# Patient Record
Sex: FEMALE | Race: BLACK OR AFRICAN AMERICAN | Employment: OTHER | URBAN - METROPOLITAN AREA
[De-identification: names, ages, dates, MRNs, and addresses within clinical notes are randomized per-mention and may not be internally consistent; named-entity substitution may affect disease eponyms.]

---

## 2018-09-11 ENCOUNTER — APPOINTMENT (OUTPATIENT)
Dept: CT IMAGING | Age: 83
DRG: 377 | End: 2018-09-11
Attending: EMERGENCY MEDICINE
Payer: MEDICARE

## 2018-09-11 ENCOUNTER — APPOINTMENT (OUTPATIENT)
Dept: GENERAL RADIOLOGY | Age: 83
DRG: 377 | End: 2018-09-11
Attending: EMERGENCY MEDICINE
Payer: MEDICARE

## 2018-09-11 ENCOUNTER — HOSPITAL ENCOUNTER (INPATIENT)
Age: 83
LOS: 6 days | Discharge: HOME | DRG: 377 | End: 2018-09-17
Attending: EMERGENCY MEDICINE | Admitting: HOSPITALIST
Payer: MEDICARE

## 2018-09-11 ENCOUNTER — APPOINTMENT (OUTPATIENT)
Dept: CT IMAGING | Age: 83
DRG: 377 | End: 2018-09-11
Attending: HOSPITALIST
Payer: MEDICARE

## 2018-09-11 DIAGNOSIS — N17.9 AKI (ACUTE KIDNEY INJURY) (HCC): ICD-10-CM

## 2018-09-11 DIAGNOSIS — R41.0 DELIRIUM: Primary | ICD-10-CM

## 2018-09-11 DIAGNOSIS — D64.9 ANEMIA, UNSPECIFIED TYPE: ICD-10-CM

## 2018-09-11 DIAGNOSIS — N30.01 ACUTE CYSTITIS WITH HEMATURIA: ICD-10-CM

## 2018-09-11 PROBLEM — D62 ACUTE BLOOD LOSS ANEMIA: Status: ACTIVE | Noted: 2018-09-11

## 2018-09-11 PROBLEM — K92.2 GI BLEED: Status: ACTIVE | Noted: 2018-09-11

## 2018-09-11 PROBLEM — N39.0 UTI (URINARY TRACT INFECTION): Status: ACTIVE | Noted: 2018-09-11

## 2018-09-11 PROBLEM — E87.0 HYPERNATREMIA: Status: ACTIVE | Noted: 2018-09-11

## 2018-09-11 PROBLEM — F03.90 DEMENTIA (HCC): Status: ACTIVE | Noted: 2018-09-11

## 2018-09-11 LAB
ALBUMIN SERPL-MCNC: 3.1 G/DL (ref 3.5–5)
ALBUMIN/GLOB SERPL: 1 {RATIO} (ref 1.1–2.2)
ALP SERPL-CCNC: 112 U/L (ref 45–117)
ALT SERPL-CCNC: 48 U/L (ref 12–78)
ANION GAP SERPL CALC-SCNC: 7 MMOL/L (ref 5–15)
APPEARANCE UR: ABNORMAL
AST SERPL-CCNC: 47 U/L (ref 15–37)
ATRIAL RATE: 70 BPM
BACTERIA URNS QL MICRO: NEGATIVE /HPF
BASOPHILS # BLD: 0 K/UL (ref 0–0.1)
BASOPHILS NFR BLD: 0 % (ref 0–1)
BILIRUB SERPL-MCNC: 0.4 MG/DL (ref 0.2–1)
BILIRUB UR QL: NEGATIVE
BUN SERPL-MCNC: 64 MG/DL (ref 6–20)
BUN/CREAT SERPL: 21 (ref 12–20)
CALCIUM SERPL-MCNC: 8.5 MG/DL (ref 8.5–10.1)
CALCULATED P AXIS, ECG09: 61 DEGREES
CALCULATED R AXIS, ECG10: 38 DEGREES
CALCULATED T AXIS, ECG11: 83 DEGREES
CHLORIDE SERPL-SCNC: 118 MMOL/L (ref 97–108)
CK MB CFR SERPL CALC: 2.1 % (ref 0–2.5)
CK MB SERPL-MCNC: 26.4 NG/ML (ref 5–25)
CK SERPL-CCNC: 1270 U/L (ref 26–192)
CO2 SERPL-SCNC: 25 MMOL/L (ref 21–32)
COLOR UR: ABNORMAL
CREAT SERPL-MCNC: 3.04 MG/DL (ref 0.55–1.02)
DIAGNOSIS, 93000: NORMAL
DIFFERENTIAL METHOD BLD: ABNORMAL
EOSINOPHIL # BLD: 0.1 K/UL (ref 0–0.4)
EOSINOPHIL NFR BLD: 1 % (ref 0–7)
EPITH CASTS URNS QL MICRO: ABNORMAL /LPF
ERYTHROCYTE [DISTWIDTH] IN BLOOD BY AUTOMATED COUNT: 14 % (ref 11.5–14.5)
EST. AVERAGE GLUCOSE BLD GHB EST-MCNC: ABNORMAL MG/DL
FERRITIN SERPL-MCNC: 22 NG/ML (ref 8–252)
GLOBULIN SER CALC-MCNC: 3.2 G/DL (ref 2–4)
GLUCOSE BLD STRIP.AUTO-MCNC: 124 MG/DL (ref 65–100)
GLUCOSE BLD STRIP.AUTO-MCNC: 130 MG/DL (ref 65–100)
GLUCOSE BLD STRIP.AUTO-MCNC: 80 MG/DL (ref 65–100)
GLUCOSE SERPL-MCNC: 84 MG/DL (ref 65–100)
GLUCOSE UR STRIP.AUTO-MCNC: NEGATIVE MG/DL
HBA1C MFR BLD: <3.5 % (ref 4.2–6.3)
HCT VFR BLD AUTO: 18.6 % (ref 35–47)
HEMOCCULT STL QL: POSITIVE
HGB BLD-MCNC: 5.8 G/DL (ref 11.5–16)
HGB UR QL STRIP: NEGATIVE
IMM GRANULOCYTES # BLD: 0 K/UL (ref 0–0.04)
IMM GRANULOCYTES NFR BLD AUTO: 0 % (ref 0–0.5)
INR PPP: 1 (ref 0.9–1.1)
IRON SATN MFR SERPL: 10 % (ref 20–50)
IRON SERPL-MCNC: 31 UG/DL (ref 35–150)
KETONES UR QL STRIP.AUTO: NEGATIVE MG/DL
LACTATE SERPL-SCNC: 1.2 MMOL/L (ref 0.4–2)
LEUKOCYTE ESTERASE UR QL STRIP.AUTO: ABNORMAL
LYMPHOCYTES # BLD: 0.8 K/UL (ref 0.8–3.5)
LYMPHOCYTES NFR BLD: 11 % (ref 12–49)
MAGNESIUM SERPL-MCNC: 3.2 MG/DL (ref 1.6–2.4)
MCH RBC QN AUTO: 27.9 PG (ref 26–34)
MCHC RBC AUTO-ENTMCNC: 31.2 G/DL (ref 30–36.5)
MCV RBC AUTO: 89.4 FL (ref 80–99)
MONOCYTES # BLD: 0.5 K/UL (ref 0–1)
MONOCYTES NFR BLD: 6 % (ref 5–13)
NEUTS SEG # BLD: 6.2 K/UL (ref 1.8–8)
NEUTS SEG NFR BLD: 82 % (ref 32–75)
NITRITE UR QL STRIP.AUTO: NEGATIVE
NRBC # BLD: 0 K/UL (ref 0–0.01)
NRBC BLD-RTO: 0 PER 100 WBC
P-R INTERVAL, ECG05: 162 MS
PH UR STRIP: 5 [PH] (ref 5–8)
PLATELET # BLD AUTO: 138 K/UL (ref 150–400)
PMV BLD AUTO: 12.5 FL (ref 8.9–12.9)
POTASSIUM SERPL-SCNC: 4.4 MMOL/L (ref 3.5–5.1)
PROT SERPL-MCNC: 6.3 G/DL (ref 6.4–8.2)
PROT UR STRIP-MCNC: NEGATIVE MG/DL
PROTHROMBIN TIME: 10.6 SEC (ref 9–11.1)
Q-T INTERVAL, ECG07: 410 MS
QRS DURATION, ECG06: 84 MS
QTC CALCULATION (BEZET), ECG08: 442 MS
RBC # BLD AUTO: 2.08 M/UL (ref 3.8–5.2)
RBC #/AREA URNS HPF: ABNORMAL /HPF (ref 0–5)
RBC MORPH BLD: ABNORMAL
RBC MORPH BLD: ABNORMAL
SERVICE CMNT-IMP: ABNORMAL
SERVICE CMNT-IMP: ABNORMAL
SERVICE CMNT-IMP: NORMAL
SODIUM SERPL-SCNC: 150 MMOL/L (ref 136–145)
SP GR UR REFRACTOMETRY: 1.01 (ref 1–1.03)
TIBC SERPL-MCNC: 308 UG/DL (ref 250–450)
TROPONIN I SERPL-MCNC: <0.05 NG/ML
TSH SERPL DL<=0.05 MIU/L-ACNC: 2.52 UIU/ML (ref 0.36–3.74)
UROBILINOGEN UR QL STRIP.AUTO: 0.2 EU/DL (ref 0.2–1)
VENTRICULAR RATE, ECG03: 70 BPM
VIT B12 SERPL-MCNC: >2000 PG/ML (ref 193–986)
WBC # BLD AUTO: 7.6 K/UL (ref 3.6–11)
WBC URNS QL MICRO: >100 /HPF (ref 0–4)

## 2018-09-11 PROCEDURE — 71045 X-RAY EXAM CHEST 1 VIEW: CPT

## 2018-09-11 PROCEDURE — 82550 ASSAY OF CK (CPK): CPT | Performed by: HOSPITALIST

## 2018-09-11 PROCEDURE — 80053 COMPREHEN METABOLIC PANEL: CPT | Performed by: EMERGENCY MEDICINE

## 2018-09-11 PROCEDURE — 94761 N-INVAS EAR/PLS OXIMETRY MLT: CPT

## 2018-09-11 PROCEDURE — 83735 ASSAY OF MAGNESIUM: CPT | Performed by: EMERGENCY MEDICINE

## 2018-09-11 PROCEDURE — 85025 COMPLETE CBC W/AUTO DIFF WBC: CPT | Performed by: EMERGENCY MEDICINE

## 2018-09-11 PROCEDURE — 81001 URINALYSIS AUTO W/SCOPE: CPT | Performed by: EMERGENCY MEDICINE

## 2018-09-11 PROCEDURE — 74176 CT ABD & PELVIS W/O CONTRAST: CPT

## 2018-09-11 PROCEDURE — 36415 COLL VENOUS BLD VENIPUNCTURE: CPT | Performed by: EMERGENCY MEDICINE

## 2018-09-11 PROCEDURE — 87040 BLOOD CULTURE FOR BACTERIA: CPT | Performed by: EMERGENCY MEDICINE

## 2018-09-11 PROCEDURE — 82607 VITAMIN B-12: CPT | Performed by: HOSPITALIST

## 2018-09-11 PROCEDURE — 70450 CT HEAD/BRAIN W/O DYE: CPT

## 2018-09-11 PROCEDURE — 82962 GLUCOSE BLOOD TEST: CPT

## 2018-09-11 PROCEDURE — C9113 INJ PANTOPRAZOLE SODIUM, VIA: HCPCS | Performed by: HOSPITALIST

## 2018-09-11 PROCEDURE — 74011000258 HC RX REV CODE- 258: Performed by: HOSPITALIST

## 2018-09-11 PROCEDURE — 84484 ASSAY OF TROPONIN QUANT: CPT | Performed by: HOSPITALIST

## 2018-09-11 PROCEDURE — 82728 ASSAY OF FERRITIN: CPT | Performed by: HOSPITALIST

## 2018-09-11 PROCEDURE — 74011000250 HC RX REV CODE- 250: Performed by: HOSPITALIST

## 2018-09-11 PROCEDURE — 74011250636 HC RX REV CODE- 250/636: Performed by: HOSPITALIST

## 2018-09-11 PROCEDURE — 36430 TRANSFUSION BLD/BLD COMPNT: CPT

## 2018-09-11 PROCEDURE — 83540 ASSAY OF IRON: CPT | Performed by: HOSPITALIST

## 2018-09-11 PROCEDURE — 82272 OCCULT BLD FECES 1-3 TESTS: CPT | Performed by: EMERGENCY MEDICINE

## 2018-09-11 PROCEDURE — 99285 EMERGENCY DEPT VISIT HI MDM: CPT

## 2018-09-11 PROCEDURE — 93005 ELECTROCARDIOGRAM TRACING: CPT

## 2018-09-11 PROCEDURE — 65660000000 HC RM CCU STEPDOWN

## 2018-09-11 PROCEDURE — 85610 PROTHROMBIN TIME: CPT | Performed by: EMERGENCY MEDICINE

## 2018-09-11 PROCEDURE — 71250 CT THORAX DX C-: CPT

## 2018-09-11 PROCEDURE — P9016 RBC LEUKOCYTES REDUCED: HCPCS | Performed by: EMERGENCY MEDICINE

## 2018-09-11 PROCEDURE — 86923 COMPATIBILITY TEST ELECTRIC: CPT | Performed by: EMERGENCY MEDICINE

## 2018-09-11 PROCEDURE — 84443 ASSAY THYROID STIM HORMONE: CPT | Performed by: HOSPITALIST

## 2018-09-11 PROCEDURE — 86900 BLOOD TYPING SEROLOGIC ABO: CPT | Performed by: EMERGENCY MEDICINE

## 2018-09-11 PROCEDURE — 87086 URINE CULTURE/COLONY COUNT: CPT | Performed by: EMERGENCY MEDICINE

## 2018-09-11 PROCEDURE — 83605 ASSAY OF LACTIC ACID: CPT | Performed by: EMERGENCY MEDICINE

## 2018-09-11 PROCEDURE — 83036 HEMOGLOBIN GLYCOSYLATED A1C: CPT | Performed by: HOSPITALIST

## 2018-09-11 PROCEDURE — 96367 TX/PROPH/DG ADDL SEQ IV INF: CPT

## 2018-09-11 PROCEDURE — 96365 THER/PROPH/DIAG IV INF INIT: CPT

## 2018-09-11 PROCEDURE — 74011250636 HC RX REV CODE- 250/636: Performed by: EMERGENCY MEDICINE

## 2018-09-11 PROCEDURE — 74011000258 HC RX REV CODE- 258: Performed by: EMERGENCY MEDICINE

## 2018-09-11 RX ORDER — MELATONIN
1000 DAILY
COMMUNITY

## 2018-09-11 RX ORDER — CETIRIZINE HCL 10 MG
10 TABLET ORAL DAILY
COMMUNITY

## 2018-09-11 RX ORDER — DIPHENHYDRAMINE HCL 25 MG
25 TABLET ORAL
COMMUNITY

## 2018-09-11 RX ORDER — ACETAMINOPHEN 650 MG/1
650 SUPPOSITORY RECTAL
Status: DISCONTINUED | OUTPATIENT
Start: 2018-09-11 | End: 2018-09-17 | Stop reason: HOSPADM

## 2018-09-11 RX ORDER — HALOPERIDOL 5 MG/ML
2 INJECTION INTRAMUSCULAR
Status: DISCONTINUED | OUTPATIENT
Start: 2018-09-11 | End: 2018-09-15

## 2018-09-11 RX ORDER — LOSARTAN POTASSIUM 50 MG/1
50 TABLET ORAL DAILY
COMMUNITY
End: 2018-09-17

## 2018-09-11 RX ORDER — SODIUM CHLORIDE 9 MG/ML
150 INJECTION, SOLUTION INTRAVENOUS ONCE
Status: COMPLETED | OUTPATIENT
Start: 2018-09-11 | End: 2018-09-11

## 2018-09-11 RX ORDER — DEXTROSE 50 % IN WATER (D50W) INTRAVENOUS SYRINGE
12.5-25 AS NEEDED
Status: DISCONTINUED | OUTPATIENT
Start: 2018-09-11 | End: 2018-09-17 | Stop reason: HOSPADM

## 2018-09-11 RX ORDER — ASPIRIN 81 MG/1
81 TABLET ORAL DAILY
COMMUNITY

## 2018-09-11 RX ORDER — SODIUM CHLORIDE 0.9 % (FLUSH) 0.9 %
5-10 SYRINGE (ML) INJECTION AS NEEDED
Status: DISCONTINUED | OUTPATIENT
Start: 2018-09-11 | End: 2018-09-17 | Stop reason: HOSPADM

## 2018-09-11 RX ORDER — INSULIN LISPRO 100 [IU]/ML
INJECTION, SOLUTION INTRAVENOUS; SUBCUTANEOUS EVERY 6 HOURS
Status: DISCONTINUED | OUTPATIENT
Start: 2018-09-11 | End: 2018-09-17 | Stop reason: HOSPADM

## 2018-09-11 RX ORDER — QUETIAPINE FUMARATE 25 MG/1
25 TABLET, FILM COATED ORAL 2 TIMES DAILY
COMMUNITY

## 2018-09-11 RX ORDER — DEXTROSE MONOHYDRATE AND SODIUM CHLORIDE 5; .45 G/100ML; G/100ML
125 INJECTION, SOLUTION INTRAVENOUS CONTINUOUS
Status: DISCONTINUED | OUTPATIENT
Start: 2018-09-11 | End: 2018-09-13

## 2018-09-11 RX ORDER — SODIUM CHLORIDE 0.9 % (FLUSH) 0.9 %
5-10 SYRINGE (ML) INJECTION EVERY 8 HOURS
Status: DISCONTINUED | OUTPATIENT
Start: 2018-09-11 | End: 2018-09-17 | Stop reason: HOSPADM

## 2018-09-11 RX ORDER — ONDANSETRON 2 MG/ML
4 INJECTION INTRAMUSCULAR; INTRAVENOUS
Status: DISCONTINUED | OUTPATIENT
Start: 2018-09-11 | End: 2018-09-17 | Stop reason: HOSPADM

## 2018-09-11 RX ORDER — SODIUM CHLORIDE 9 MG/ML
250 INJECTION, SOLUTION INTRAVENOUS AS NEEDED
Status: DISCONTINUED | OUTPATIENT
Start: 2018-09-11 | End: 2018-09-17 | Stop reason: HOSPADM

## 2018-09-11 RX ORDER — MAGNESIUM SULFATE 100 %
4 CRYSTALS MISCELLANEOUS AS NEEDED
Status: DISCONTINUED | OUTPATIENT
Start: 2018-09-11 | End: 2018-09-17 | Stop reason: HOSPADM

## 2018-09-11 RX ORDER — AMLODIPINE BESYLATE 10 MG/1
10 TABLET ORAL DAILY
COMMUNITY

## 2018-09-11 RX ADMIN — SODIUM CHLORIDE 40 MG: 9 INJECTION, SOLUTION INTRAMUSCULAR; INTRAVENOUS; SUBCUTANEOUS at 14:17

## 2018-09-11 RX ADMIN — SODIUM CHLORIDE 40 MG: 9 INJECTION, SOLUTION INTRAMUSCULAR; INTRAVENOUS; SUBCUTANEOUS at 20:26

## 2018-09-11 RX ADMIN — DEXTROSE MONOHYDRATE AND SODIUM CHLORIDE 125 ML/HR: 5; .45 INJECTION, SOLUTION INTRAVENOUS at 22:15

## 2018-09-11 RX ADMIN — AZITHROMYCIN MONOHYDRATE 500 MG: 500 INJECTION, POWDER, LYOPHILIZED, FOR SOLUTION INTRAVENOUS at 11:51

## 2018-09-11 RX ADMIN — Medication 10 ML: at 14:14

## 2018-09-11 RX ADMIN — CEFTRIAXONE 1 G: 1 INJECTION, POWDER, FOR SOLUTION INTRAMUSCULAR; INTRAVENOUS at 11:10

## 2018-09-11 RX ADMIN — SODIUM CHLORIDE 150 ML/HR: 900 INJECTION, SOLUTION INTRAVENOUS at 10:19

## 2018-09-11 RX ADMIN — DEXTROSE MONOHYDRATE AND SODIUM CHLORIDE 125 ML/HR: 5; .45 INJECTION, SOLUTION INTRAVENOUS at 14:17

## 2018-09-11 RX ADMIN — Medication 10 ML: at 21:37

## 2018-09-11 RX ADMIN — SODIUM CHLORIDE 500 ML: 900 INJECTION, SOLUTION INTRAVENOUS at 10:25

## 2018-09-11 NOTE — ED NOTES
TRANSFER - OUT REPORT: 
 
Verbal report given to Alona Guerrero RN (name) on Dyllan Peoples  being transferred to [address] [place] [address] (unit) for routine progression of care Report consisted of patients Situation, Background, Assessment and  
Recommendations(SBAR). Information from the following report(s) SBAR was reviewed with the receiving nurse. Lines:  
Peripheral IV 09/11/18 Left;Upper Arm (Active) Site Assessment Clean, dry, & intact 9/11/2018  9:49 AM  
Phlebitis Assessment 0 9/11/2018  9:49 AM  
Infiltration Assessment 0 9/11/2018  9:49 AM  
Dressing Status Clean, dry, & intact 9/11/2018  9:49 AM  
   
Peripheral IV 09/11/18 Right Antecubital (Active) Site Assessment Clean, dry, & intact 9/11/2018 12:00 PM  
Phlebitis Assessment 0 9/11/2018 12:00 PM  
Infiltration Assessment 0 9/11/2018 12:00 PM  
Dressing Status Clean, dry, & intact 9/11/2018 12:00 PM  
Dressing Type Tape;Transparent 9/11/2018 12:00 PM  
Hub Color/Line Status Pink;Flushed 9/11/2018 12:00 PM  
  
 
Opportunity for questions and clarification was provided.

## 2018-09-11 NOTE — PROGRESS NOTES
TRANSFER - IN REPORT: 
 
Verbal report received from Rosalva(name) on Boone Lewis  being received from ER(unit) for routine progression of care Report consisted of patients Situation, Background, Assessment and  
Recommendations(SBAR). Information from the following report(s) SBAR, Kardex, Procedure Summary, Intake/Output, MAR and Recent Results was reviewed with the receiving nurse. Opportunity for questions and clarification was provided. Assessment completed upon patients arrival to unit and care assumed.

## 2018-09-11 NOTE — PROGRESS NOTES
Problem: Pressure Injury - Risk of 
Goal: *Prevention of pressure injury Document Rufino Scale and appropriate interventions in the flowsheet. Outcome: Progressing Towards Goal 
Pressure Injury Interventions: 
Sensory Interventions: Assess changes in LOC Moisture Interventions: Apply protective barrier, creams and emollients, Absorbent underpads, Check for incontinence Q2 hours and as needed Activity Interventions: Increase time out of bed, PT/OT evaluation Mobility Interventions: HOB 30 degrees or less Nutrition Interventions: Document food/fluid/supplement intake Friction and Shear Interventions: Apply protective barrier, creams and emollients, HOB 30 degrees or less, Lift sheet

## 2018-09-11 NOTE — IP AVS SNAPSHOT
[address] 
[phone] Patient: Houston Lizarraga MRN: [id number] :[id number] About your hospitalization You were admitted on:  2018 You last received care in the:  [address] You were discharged on:  2018 Why you were hospitalized Your primary diagnosis was:  Acute Blood Loss Anemia Your diagnoses also included:  Delirium, Hypernatremia, Uti (Urinary Tract Infection), Gi Bleed, Dementia, Wood (Acute Kidney Injury) (Formerly [facility]) Follow-up Information Follow up With Details Comments Contact Info Provider Unknown   Patient not available to ask Discharge Orders None A check emma indicates which time of day the medication should be taken. My Medications START taking these medications Instructions Each Dose to Equal  
 Morning Noon Evening Bedtime  
 pantoprazole 40 mg tablet Commonly known as:  PROTONIX Your last dose was: Your next dose is: Take 1 Tab by mouth two (2) times a day. 40 mg CONTINUE taking these medications Instructions Each Dose to Equal  
 Morning Noon Evening Bedtime  
 amLODIPine 10 mg tablet Commonly known as:  Nubia Killian Your last dose was: Your next dose is: Take 10 mg by mouth daily. 10 mg  
    
   
   
   
  
 aspirin delayed-release 81 mg tablet Your last dose was: Your next dose is: Take 81 mg by mouth daily. 81 mg  
    
   
   
   
  
 BENADRYL ALLERGY 25 mg tablet Generic drug:  diphenhydrAMINE Your last dose was: Your next dose is: Take 25 mg by mouth nightly as needed (seasonal allergies). 25 mg  
    
   
   
   
  
 cetirizine 10 mg tablet Commonly known as:  ZYRTEC Your last dose was: Your next dose is: Take 10 mg by mouth daily. 10 mg  
    
   
   
   
  
 cholecalciferol 1,000 unit tablet Commonly known as:  VITAMIN D3 Your last dose was: Your next dose is: Take 1,000 Units by mouth daily. 1000 Units QUEtiapine 25 mg tablet Commonly known as:  SEROquel Your last dose was: Your next dose is: Take 25 mg by mouth two (2) times a day. 25 mg  
    
   
   
   
  
  
STOP taking these medications   
 losartan 50 mg tablet Commonly known as:  COZAAR Where to Get Your Medications Information on where to get these meds will be given to you by the nurse or doctor. ! Ask your nurse or doctor about these medications  
  pantoprazole 40 mg tablet Discharge Instructions HOSPITALIST DISCHARGE INSTRUCTIONS 
 
NAME: Saida Dodson :  1924 MRN:  [id number] Date/Time:  2018 10:11 AM 
 
ADMIT DATE: 2018 DISCHARGE DATE: 2018 Attending Physician: Jerome Krishnan MD 
 
DISCHARGE DIAGNOSIS: 
Anemia Gastritis Medications: Per above medication reconciliation. Pain Management: per above medications Recommended diet: Dysphagia diet Recommended activity: Activity as tolerated Wound care: None Indwelling devices:  None Supplemental Oxygen: None Required Lab work: CBC in 1 week, BMP in 1 week. Glucose management:  None Code status: DNR Outside physician follow up: Follow-up Information Follow up With Details Comments Contact Info Provider Unknown   Patient not available to ask Follow with GI doctor in 2-4 weeks. Will be discharged home under the care of her daughter Information obtained by : 
I understand that if any problems occur once I am at home I am to contact my physician. I understand and acknowledge receipt of the instructions indicated above. Physician's or R.N.'s Signature                                                                  Date/Time Patient or Repres MyChart Announcement We are excited to announce that we are making your provider's discharge notes available to you in [employer] You will see these notes when they are completed and signed by the physician that discharged you from your recent hospital stay. If you have any questions or concerns about any information you see in [employer] please call the Health Information Department where you were seen or reach out to your Primary Care Provider for more information about your plan of care. Introducing [facility]! Chastity Pollard introduces [employer] patient portal. Now you can access parts of your medical record, email your doctor's office, and request medication refills online. 1. In your internet browser, go to [email or web address] 2. Click on the First Time User? Click Here link in the Sign In box. You will see the New Member Sign Up page. 3. Enter your [employer] Access Code exactly as it appears below. You will not need to use this code after youve completed the sign-up process. If you do not sign up before the expiration date, you must request a new code. · [employer] Access Code: [email or web address] Expires: 12/16/2018 10:38 AM 
 
4. Enter the last four digits of your Social Security Number (xxxx) and Date of Birth (mm/dd/yyyy) as indicated and click Submit. You will be taken to the next sign-up page. 5. Create a [employer] ID. This will be your [employer] login ID and cannot be changed, so think of one that is secure and easy to remember. 6. Create a [employer] password. You can change your password at any time. 7. Enter your Password Reset Question and Answer. This can be used at a later time if you forget your password. 8. Enter your e-mail address. You will receive e-mail notification when new information is available in [address] 9. Click Sign Up. You can now view and download portions of your medical record. 10. Click the Download Summary menu link to download a portable copy of your medical information. If you have questions, please visit the Frequently Asked Questions section of the [employer] website. Remember, [employer] is NOT to be used for urgent needs. For medical emergencies, dial 911. Now available from your iPhone and Android! Introducing Deni Liang As a [facility] patient, I wanted to make you aware of our electronic visit tool called Deni Hirocolleennicole. [employer] allows you to connect within minutes with a medical provider 24 hours a day, seven days a week via a mobile device or tablet or logging into a secure website from your computer. You can access Deni Liang from anywhere in the [place]. A virtual visit might be right for you when you have a simple condition and feel like you just dont want to get out of bed, or cant get away from work for an appointment, when your regular [facility] Black[facility] provider is not available (evenings, weekends or holidays), or when youre out of town and need minor care. Electronic visits cost only $49 and if the [employer] provider determines a prescription is needed to treat your condition, one can be electronically transmitted to a nearby pharmacy*. Please take a moment to enroll today if you have not already done so. The enrollment process is free and takes just a few minutes. To enroll, please download the [employer] edgar to your tablet or phone, or visit [email or web address] to enroll on your computer. And, as an [address] [place] [address] patient with a [employer] account, the results of your visits will be scanned into your electronic medical record and your primary care provider will be able to view the scanned results. We urge you to continue to see your regular [facility] provider for your ongoing medical care. And while your primary care provider may not be the one available when you seek a Deni Waldropfin virtual visit, the peace of mind you get from getting a real diagnosis real time can be priceless. For more information on Deni Waldropfin, view our Frequently Asked Questions (FAQs) at [email or web address]. Sincerely, 
 
Delon Thomas MD 
Chief Medical Officer Marco8 Cintia Jessica *:  certain medications cannot be prescribed via Deni Hirocolleenfin Providers Seen During Your Hospitalization Provider Specialty Primary office phone Rusty Alcocer. Lissy Piedra MD Emergency Medicine [phone] Enedina Wolfe MD Internal Medicine [phone] Stephanie Tracy MD Internal Medicine [phone] Warren Staley MD Internal Medicine [phone] Cecil Darnell MD Internal Medicine [phone] Your Primary Care Physician (PCP) Primary Care Physician Office Phone Office Fax UNKNOWN, PROVIDER ** None ** ** None ** You are allergic to the following No active allergies Recent Documentation Weight OB Status Smoking Status 51.2 kg Postmenopausal Never Assessed Emergency Contacts Name Discharge Info Relation Home Work Mobile Christianne Miguel DISCHARGE CAREGIVER [3] Daughter [21] [phone] Patient Belongings The following personal items are in your possession at time of discharge: 
  Dental Appliances: None         Home Medications: None   Jewelry: None  Clothing: Pajamas    Other Valuables: None Please provide this summary of care documentation to your next provider. Signatures-by signing, you are acknowledging that this After Visit Summary has been reviewed with you and you have received a copy. Patient Signature:  ____________________________________________________________ Date:  ____________________________________________________________  
  
Meme Hero Provider Signature:  ____________________________________________________________ Date:  ____________________________________________________________

## 2018-09-11 NOTE — PROGRESS NOTES
PT consult received; noted start time 00:00 9/12 - will f/u tomorrow for evaluation. Soraya Prince, PT, DPT Board-Certified Geriatric Clinical Specialist  
Certified Exercise Expert for Aging Adults

## 2018-09-11 NOTE — PROGRESS NOTES
Pharmacy Clarification of Prior to Admission Medication Regimen The patient was not interviewed regarding clarification of the prior to admission medication regimen. Patient was transferred from [address] [place] [address], to [facility], with a current med list. Pharmacy called [address] [place] [address], [phone], and spoke with Teodoro Pineda, owner, who was able to verify the patient's last administered doses. Information Obtained From: transfer papers Pertinent Pharmacy Findings:  
Updated patients preferred outpatient pharmacy to: [facility] did not update the outpatient pharmacy due to the patient living at a SNF 
 
PTA medication list was corrected to the following:  
 
Prior to Admission Medications Prescriptions Last Dose Informant Patient Reported? Taking? QUEtiapine (SEROQUEL) 25 mg tablet 9/10/2018 at 1700 Transfer Papers Yes Yes Sig: Take 25 mg by mouth two (2) times a day. amLODIPine (NORVASC) 10 mg tablet 9/10/2018 at 0900 Transfer Papers Yes Yes Sig: Take 10 mg by mouth daily. aspirin delayed-release 81 mg tablet 9/10/2018 at 0900 Transfer Papers Yes Yes Sig: Take 81 mg by mouth daily. cetirizine (ZYRTEC) 10 mg tablet 9/10/2018 at 0900 Transfer Papers Yes Yes Sig: Take 10 mg by mouth daily. cholecalciferol (VITAMIN D3) 1,000 unit tablet 9/10/2018 at 0900 Transfer Papers Yes Yes Sig: Take 1,000 Units by mouth daily. diphenhydrAMINE (BENADRYL ALLERGY) 25 mg tablet 9/6/2018 at 2100 Transfer Papers Yes Yes Sig: Take 25 mg by mouth nightly as needed (seasonal allergies). losartan (COZAAR) 50 mg tablet 9/10/2018 at 0900 Transfer Papers Yes Yes Sig: Take 50 mg by mouth daily. Facility-Administered Medications: None Thank you, 
Ana Maria Lewis CPhT Medication History Pharmacy Technician

## 2018-09-11 NOTE — ED PROVIDER NOTES
EMERGENCY DEPARTMENT HISTORY AND PHYSICAL EXAM 
 
 
Date: 9/11/2018 Patient Name: Juan C Alexis History of Presenting Illness Chief Complaint Patient presents with  Agitation EMS reports increased agitation and elevated BP, pt refused to eat and walk since yesterday. FSBS 76. Hx of Dementia, lives at private adult home History Provided By: Patient HPI: Juan C Alexis, 80 y.o. female with PMHx significant for HTN and dementia, presents via EMS transfer from assisted living to the ED with cc of agitation and elevated BP. Pt currently resides in an assisted living home and the staff has sent her for evaluation. They note an increase in agitation and BP along with a refusal to eat or drink since yesterday. Her rectal temp was 94.8F on last check. Her blood sugar was 76. HPI and ROS are limited due to AMS. There are no other complaints, changes, or physical findings at this time. PCP: PROVIDER UNKNOWN 
 
 
 
Past History Past Medical History: No past medical history on file. Past Surgical History: No past surgical history on file. Family History: No family history on file. Social History: 
Social History Substance Use Topics  Smoking status: Not on file  Smokeless tobacco: Not on file  Alcohol use Not on file Allergies: 
No Known Allergies Review of Systems Review of Systems Unable to perform ROS: Mental status change Physical Exam  
Physical Exam  
Constitutional: She is oriented to person, place, and time. She appears well-developed and well-nourished. Elderly female Nonverbal secondary to dementia HENT:  
Head: Normocephalic and atraumatic. Partially edentulous with dry MM Eyes: Conjunctivae and EOM are normal. Right eye exhibits no discharge. Left eye exhibits no discharge. Pupils are pinpoint Neck: Normal range of motion. Neck supple. No JVD present. Cardiovascular: Normal rate, regular rhythm and normal heart sounds. No murmur heard. Pulmonary/Chest: Effort normal and breath sounds normal. No respiratory distress. She has no wheezes. She has no rales. Abdominal: Soft. Bowel sounds are normal. She exhibits no distension. There is no tenderness. Musculoskeletal: Normal range of motion. She exhibits no edema. Neurological: She is alert and oriented to person, place, and time. No cranial nerve deficit. She exhibits normal muscle tone. Skin: Skin is warm and dry. No rash noted. She is not diaphoretic. No evidence of wounds Psychiatric:  
Appears agitated and hallucinatory- grabbing things out of the air Nursing note and vitals reviewed. Diagnostic Study Results Labs - Recent Results (from the past 12 hour(s)) GLUCOSE, POC Collection Time: 09/11/18  9:17 AM  
Result Value Ref Range Glucose (POC) 80 65 - 100 mg/dL Performed by Florencia Blood (PCT) CBC WITH AUTOMATED DIFF Collection Time: 09/11/18  9:34 AM  
Result Value Ref Range WBC 7.6 3.6 - 11.0 K/uL  
 RBC 2.08 (L) 3.80 - 5.20 M/uL HGB 5.8 (LL) 11.5 - 16.0 g/dL HCT 18.6 (L) 35.0 - 47.0 % MCV 89.4 80.0 - 99.0 FL  
 MCH 27.9 26.0 - 34.0 PG  
 MCHC 31.2 30.0 - 36.5 g/dL  
 RDW 14.0 11.5 - 14.5 % PLATELET 875 (L) 717 - 400 K/uL MPV 12.5 8.9 - 12.9 FL  
 NRBC 0.0 0  WBC ABSOLUTE NRBC 0.00 0.00 - 0.01 K/uL NEUTROPHILS PENDING % LYMPHOCYTES PENDING % MONOCYTES PENDING % EOSINOPHILS PENDING % BASOPHILS PENDING % IMMATURE GRANULOCYTES PENDING %  
 ABS. NEUTROPHILS PENDING K/UL  
 ABS. LYMPHOCYTES PENDING K/UL  
 ABS. MONOCYTES PENDING K/UL  
 ABS. EOSINOPHILS PENDING K/UL  
 ABS. BASOPHILS PENDING K/UL  
 ABS. IMM. GRANS. PENDING K/UL  
 DF PENDING   
METABOLIC PANEL, COMPREHENSIVE Collection Time: 09/11/18  9:34 AM  
Result Value Ref Range Sodium 150 (H) 136 - 145 mmol/L  Potassium 4.4 3.5 - 5.1 mmol/L  
 Chloride 118 (H) 97 - 108 mmol/L  
 CO2 25 21 - 32 mmol/L Anion gap 7 5 - 15 mmol/L Glucose 84 65 - 100 mg/dL BUN 64 (H) 6 - 20 MG/DL Creatinine 3.04 (H) 0.55 - 1.02 MG/DL  
 BUN/Creatinine ratio 21 (H) 12 - 20 GFR est AA 17 (L) >60 ml/min/1.73m2 GFR est non-AA 14 (L) >60 ml/min/1.73m2 Calcium 8.5 8.5 - 10.1 MG/DL Bilirubin, total 0.4 0.2 - 1.0 MG/DL  
 ALT (SGPT) 48 12 - 78 U/L  
 AST (SGOT) 47 (H) 15 - 37 U/L Alk. phosphatase 112 45 - 117 U/L Protein, total 6.3 (L) 6.4 - 8.2 g/dL Albumin 3.1 (L) 3.5 - 5.0 g/dL Globulin 3.2 2.0 - 4.0 g/dL A-G Ratio 1.0 (L) 1.1 - 2.2 URINALYSIS W/ RFLX MICROSCOPIC Collection Time: 09/11/18  9:34 AM  
Result Value Ref Range Color YELLOW/STRAW Appearance CLOUDY (A) CLEAR Specific gravity 1.014 1.003 - 1.030    
 pH (UA) 5.0 5.0 - 8.0 Protein NEGATIVE  NEG mg/dL Glucose NEGATIVE  NEG mg/dL Ketone NEGATIVE  NEG mg/dL Bilirubin NEGATIVE  NEG Blood NEGATIVE  NEG Urobilinogen 0.2 0.2 - 1.0 EU/dL Nitrites NEGATIVE  NEG Leukocyte Esterase LARGE (A) NEG MAGNESIUM Collection Time: 09/11/18  9:34 AM  
Result Value Ref Range Magnesium 3.2 (H) 1.6 - 2.4 mg/dL Radiologic Studies -  
CT Results  (Last 48 hours) 09/11/18 0958  CT HEAD WO CONT Final result Impression:  IMPRESSION: No acute intracranial hemorrhage, mass or infarct. Narrative:  INDICATION: Decreased alertness; Dementia, alzheimers possible Exam: Noncontrast CT of the brain is performed with 5 mm collimation. CT dose reduction was achieved with the use of the standardized protocol  
tailored for this examination and automatic exposure control for dose  
modulation. FINDINGS: There is mild, age-appropriate diffuse cortical atrophy with ex vacuo  
dilatation of the ventricular system.  There is no acute intracranial hemorrhage,  
 mass, mass effect or herniation. There is no evidence of acute territorial  
infarct. The gray-white matter differentiation is well-preserved. The mastoid  
air cells are well pneumatized. The visualized paranasal sinuses are normal.  
   
  
  
 
CXR Results  (Last 48 hours) 09/11/18 0945  XR CHEST PORT Final result Impression:  IMPRESSION:  
1. Abnormal opacity in the right suprahilar region extending superiorly could  
reflect pneumonia. Neoplasm is not excluded. Narrative:  EXAM:  XR CHEST PORT INDICATION:  Altered mental status, evaluate for infectious process. COMPARISON: None FINDINGS: A portable AP radiograph of the chest was obtained at 0941 hours. The  
patient is on a cardiac monitor. The heart size is at the upper limits of  
normal.  
   
There is abnormality in the right suprahilar region extending toward the right  
upper lung zone. This may reflect pneumonia this could reflect mass lesion. When  
feasible PA and lateral chest is suggested. CT scan may yield more information. Left lung is clear. Visualized osseous structures are unremarkable. Medical Decision Making I am the first provider for this patient. I reviewed the vital signs, available nursing notes, past medical history, past surgical history, family history and social history. Vital Signs-Reviewed the patient's vital signs. Patient Vitals for the past 12 hrs: 
 Temp Pulse Resp BP SpO2  
09/11/18 0913 94.9 °F (34.9 °C) 69 18 (!) 118/99 100 % Pulse Oximetry Analysis - 100% on RA Cardiac Monitor:  
Rate: 69 bpm 
Rhythm: Normal Sinus Rhythm Records Reviewed: Nursing Notes and Old Medical Records Provider Notes (Medical Decision Making):  
Elderly female with dementia, but normally able to care for her ADLs presenting with acute decompensation. Possible infection, vascular insult, metabolic derangement. ED Course: Initial assessment performed. The patients presenting problems have been discussed, and they are in agreement with the care plan formulated and outlined with them. I have encouraged them to ask questions as they arise throughout their visit. 10:27 AM 
Spoke with family. They are updated regarding the patients current status and recommendations for admission. CONSULT NOTE:  
10:33 AM  
Kristina Wells MD spoke with Dr. Eliu Bell, Specialty: Hospitalist 
Discussed pt's hx, disposition, and available diagnostic and imaging results. Reviewed care plans. Consultant will evaluate pt for admission. Written by Maico Rice, ED Scribe, as dictated by Kristina Wells MD. 
 
Procedure Note - Rectal Exam:  
10:51 AM 
Performed by: Kristina Wells MD 
Chaperoned by: Duncan Palacios, [facility] Tech Rectal exam performed. brown stool was collected. Stool was collected and sent to the lab for Hemoccult testing. Other findings: No gross blood The procedure took 1-15 minutes, and pt tolerated well. Critical Care Time: 
0 Disposition: 
Admit Note: 
10:35 AM 
Pt is being admitted by Dr. Eliu Bell. The results of their tests and reason(s) for their admission have been discussed with pt and/or available family. They convey agreement and understanding for the need to be admitted and for admission diagnosis. PLAN: 
1. Admit to hospitalist 
 
Diagnosis Clinical Impression: 1. Delirium 2. Acute cystitis with hematuria 3. LAWANDA (acute kidney injury) ([facility] [address] 4. Anemia, unspecified type Attestations: This note is prepared by Mamta Dick, acting as Scribe for Kristina Wells MD. Kristina Wells MD: The scribe's documentation has been prepared under my direction and personally reviewed by me in its entirety. I confirm that the note above accurately reflects all work, treatment, procedures, and medical decision making performed by me.

## 2018-09-11 NOTE — IP AVS SNAPSHOT
[address] [facility] [address] 
[phone] Patient: Lucy Lui MRN: [id number] :1924 A check emma indicates which time of day the medication should be taken. My Medications START taking these medications Instructions Each Dose to Equal  
 Morning Noon Evening Bedtime  
 pantoprazole 40 mg tablet Commonly known as:  PROTONIX Your last dose was: Your next dose is: Take 1 Tab by mouth two (2) times a day. 40 mg CONTINUE taking these medications Instructions Each Dose to Equal  
 Morning Noon Evening Bedtime  
 amLODIPine 10 mg tablet Commonly known as:  Rosario Fanti Your last dose was: Your next dose is: Take 10 mg by mouth daily. 10 mg  
    
   
   
   
  
 aspirin delayed-release 81 mg tablet Your last dose was: Your next dose is: Take 81 mg by mouth daily. 81 mg  
    
   
   
   
  
 BENADRYL ALLERGY 25 mg tablet Generic drug:  diphenhydrAMINE Your last dose was: Your next dose is: Take 25 mg by mouth nightly as needed (seasonal allergies). 25 mg  
    
   
   
   
  
 cetirizine 10 mg tablet Commonly known as:  ZYRTEC Your last dose was: Your next dose is: Take 10 mg by mouth daily. 10 mg  
    
   
   
   
  
 cholecalciferol 1,000 unit tablet Commonly known as:  VITAMIN D3 Your last dose was: Your next dose is: Take 1,000 Units by mouth daily. 1000 Units QUEtiapine 25 mg tablet Commonly known as:  SEROquel Your last dose was: Your next dose is: Take 25 mg by mouth two (2) times a day. 25 mg  
    
   
   
   
  
  
STOP taking these medications   
 losartan 50 mg tablet Commonly known as:  COZAAR Where to Get Your Medications Information on where to get these meds will be given to you by the nurse or doctor. ! Ask your nurse or doctor about these medications  
  pantoprazole 40 mg tablet

## 2018-09-11 NOTE — H&P
Hospitalist Admission Note NAME: Adelaide Holloway :  1924 MRN:  [id number] Date/Time:  2018 12:36 PM 
 
Patient PCP: Darling Vasquez in [place] 
______________________________________________________________________ Assessment & Plan: 
Severe anemia hg 5.8 with Heme positive dark brown stool, POA concerning for GI bleed, ?upper since BUN elevated 64. 
--transfuse 2 units prbc. Goal hgb >7. 
--per daughter no hx anemia requiring transfusion, no hx PUD. Has had colonoscopy done, possibly at [facility]. 
--hold aspirin 
--empiric IV PPI 
--CT abdomen. GI consult if CT negative. --check iron profile, retic, B12, ferritin. Hypernatremia Na 150 LAWANDA Cr 3.04, possibly due to dehydration along with ARB use 
--IVF with 1/2NS 
--CT abdomen to eval for hydronephrosis, stone 
--no hx renal dz per daughter Possible uti with hypothermia, pyuria 
--no bacteria on urine. Continue rocephin. Check urine culture Acute delirium with agitation in setting of dementia,  
--per daughter Ashutosh Corbett and caregiver Shira Palomo, baseline conversant, playful but strong minded, ambulate independently, require assist with dress/bath, can feed self. --currently agitated with medical intervention or examination -- using arms to push examiner away, trying to bite examiner's arms, nonverbal. 
--CT head without acute process. --Suspect metabolic encephalopathy. --npo until more alert, cooperative. IVF, haldol prn 
--if mental status not improving with correction of Na and hydration, consider MRI brain and neurology consult. Scarring in right suprahilar on CTA chest.  Abnormal CXR with right suprahilar airspace disease 
--given rocephin and azithromycin in ER for possible PNA. However, CTA chest without lung mass or airspace disease. Abnormality on CXR due to atelectasis vs. Scar tissue. Not hypoxic.  
 
HTN 
 --reportedly /146 at assisted living. Has not taken meds yesterday or today. --BP normal here. Hold losartan due to master 
--hold amlodipine due to npo. 
--IV labetalol prn 
 
DM type 2 
--low dose SSI. Not on meds at home. Check A1c prior to transfusion There is no height or weight on file to calculate BMI. Code: d/w with mPOA, DNR/DNI 
DVT prophylaxis: SCD Surrogate decision maker:  POA daughter Juan Francisco Bran lives in [place] [phone].  [facility] is not POA Subjective: CHIEF COMPLAINT:   Agitation, elevated BP HISTORY OF PRESENT ILLNESS:    
Dong Bower is a 80 y.o.  female with dementia, HTN, DM, anxiety who is sent from private assisted living for agitation and elevated BP, possible stroke. Patient usually lives at home in [place] with  and has caregiver part time. Because daughter and caregiver going on vacation, she was put into private assisted living facility home with her  for 3 months during summer, with plan to return home later this month. At baseline, awake and conversant appropriately but does not remember; usually playful, stubborn, feed self, ambulate independently. Daughter visited over weekend and noted patient seemed somewhat lethargic and cold. Has not been eating much for several days because dentures were misplaced. Dentures found yesterday. + weight loss this year. Had complained of abdominal pain last week. Facility sent patient because she was agitated and /146 today. Has not eat or walk since yesterday We were asked to admit for work up and evaluation of the above problems. Past Medical History:  
Diagnosis Date  Dementia  Diabetes ([facility] [address]  Hypertension  Psychiatric disorder Anxiety History reviewed. No pertinent surgical history. Social History Substance Use Topics  Smoking status: Not on file  Smokeless tobacco: Not on file  Alcohol use Not on file History reviewed. No pertinent family history. Unkown No Known Allergies Prior to Admission medications Medication Sig Start Date End Date Taking? Authorizing Provider  
losartan (COZAAR) 50 mg tablet Take 50 mg by mouth daily. Yes Sam Flores MD  
amLODIPine (NORVASC) 10 mg tablet Take 10 mg by mouth daily. Yes Sam Flores MD  
cholecalciferol (VITAMIN D3) 1,000 unit tablet Take 1,000 Units by mouth daily. Yes Sam Flores MD  
cetirizine (ZYRTEC) 10 mg tablet Take 10 mg by mouth daily. Yes Sam Flores MD  
aspirin delayed-release 81 mg tablet Take 81 mg by mouth daily. Yes Sam Flores MD  
QUEtiapine (SEROQUEL) 25 mg tablet Take 25 mg by mouth two (2) times a day. Yes Sam Flores MD  
diphenhydrAMINE (BENADRYL ALLERGY) 25 mg tablet Take 25 mg by mouth nightly as needed (seasonal allergies). Yes Sam Flores MD  
 
REVIEW OF SYSTEMS:  POSITIVE= Bold. Negative = normal text Unable to obtain due to mental status Objective: VITALS:   
Visit Vitals  BP (!) 111/39 (BP 1 Location: Right arm, BP Patient Position: At rest)  Pulse 69  Temp 97.2 °F (36.2 °C)  Resp 18  Wt 51.2 kg (112 lb 14 oz)  SpO2 100% Temp (24hrs), Av.4 °F (35.8 °C), Min:94.9 °F (34.9 °C), Max:97.2 °F (36.2 °C) There is no height or weight on file to calculate BMI. PHYSICAL EXAM: 
 
General:    Thin, chronically ill appearing female, nonverbal.  Become restless and try to bite when tried to examined patient. No distress, appears stated age. HEENT: Atraumatic, anicteric sclerae, unable to check conjunctiva or pupil size as patient not cooperative. Missing teeth causing facial asymmetry. Not opening mouth. Neck:  Supple, symmetrical,  thyroid: non tender Lungs:   Poor inspiratory effort. Clear to auscultation bilaterally. No Wheezing or Rhonchi. No rales. Chest wall:  No tenderness  No Accessory muscle use. Heart:   Regular  rhythm,  No  murmur   No gallop. No edema. Abdomen:   Pushing examiner's hands away. Seems to grimace with palpation, not distended, dull to percussion. Bowel sounds normal. No masses Extremities: No cyanosis. No clubbing Skin:     Not pale Not Jaundiced  No rashes Psych:  Mild agitated when examining patient. Ethelene Gauss Neurologic: Not making eye contact Left ptosis, +facial asymmetry but may be due to lack of dentures. Aphasic. Moving arms and pushing away examiner with arms. Withdraw legs to touch. oriented X 0.. IMAGING RESULTS: 
 []       I have personally reviewed the actual   []     CXR  []     CT scan CXR: 
CT : 
EKG: 
 ________________________________________________________________________ Care Plan discussed with: 
  Comments Patient Family  y Daughter Winf[place] bedside RN Care Manager Consultant:     
________________________________________________________________________ Prophylaxis: 
GI PPI  
DVT SCD  
________________________________________________________________________ Recommended Disposition: TBD 
________________________________________________________________________ Code Status: 
Full Code DNR/DNI y  
________________________________________________________________________ TOTAL TIME:  70 minutes 
 
______________________________________________________________________ Karen Brink MD 
 
 
Procedures: see electronic medical records for all procedures/Xrays and details which were not copied into this note but were reviewed prior to creation of Plan. LAB DATA REVIEWED:   
Recent Results (from the past 24 hour(s)) GLUCOSE, POC Collection Time: 09/11/18  9:17 AM  
Result Value Ref Range Glucose (POC) 80 65 - 100 mg/dL Performed by Blease Homans (PCT) CBC WITH AUTOMATED DIFF Collection Time: 09/11/18  9:34 AM  
Result Value Ref Range WBC 7.6 3.6 - 11.0 K/uL  
 RBC 2.08 (L) 3.80 - 5.20 M/uL HGB 5.8 (LL) 11.5 - 16.0 g/dL HCT 18.6 (L) 35.0 - 47.0 % MCV 89.4 80.0 - 99.0 FL  
 MCH 27.9 26.0 - 34.0 PG  
 MCHC 31.2 30.0 - 36.5 g/dL  
 RDW 14.0 11.5 - 14.5 % PLATELET 049 (L) 616 - 400 K/uL MPV 12.5 8.9 - 12.9 FL  
 NRBC 0.0 0  WBC ABSOLUTE NRBC 0.00 0.00 - 0.01 K/uL NEUTROPHILS 82 (H) 32 - 75 % LYMPHOCYTES 11 (L) 12 - 49 % MONOCYTES 6 5 - 13 % EOSINOPHILS 1 0 - 7 % BASOPHILS 0 0 - 1 % IMMATURE GRANULOCYTES 0 0.0 - 0.5 % ABS. NEUTROPHILS 6.2 1.8 - 8.0 K/UL  
 ABS. LYMPHOCYTES 0.8 0.8 - 3.5 K/UL  
 ABS. MONOCYTES 0.5 0.0 - 1.0 K/UL  
 ABS. EOSINOPHILS 0.1 0.0 - 0.4 K/UL  
 ABS. BASOPHILS 0.0 0.0 - 0.1 K/UL  
 ABS. IMM. GRANS. 0.0 0.00 - 0.04 K/UL  
 DF AUTOMATED    
 RBC COMMENTS TARGET CELLS 1+ RBC COMMENTS HYPOCHROMIA 1+ METABOLIC PANEL, COMPREHENSIVE Collection Time: 09/11/18  9:34 AM  
Result Value Ref Range Sodium 150 (H) 136 - 145 mmol/L Potassium 4.4 3.5 - 5.1 mmol/L Chloride 118 (H) 97 - 108 mmol/L  
 CO2 25 21 - 32 mmol/L Anion gap 7 5 - 15 mmol/L Glucose 84 65 - 100 mg/dL BUN 64 (H) 6 - 20 MG/DL Creatinine 3.04 (H) 0.55 - 1.02 MG/DL  
 BUN/Creatinine ratio 21 (H) 12 - 20 GFR est AA 17 (L) >60 ml/min/1.73m2 GFR est non-AA 14 (L) >60 ml/min/1.73m2 Calcium 8.5 8.5 - 10.1 MG/DL Bilirubin, total 0.4 0.2 - 1.0 MG/DL  
 ALT (SGPT) 48 12 - 78 U/L  
 AST (SGOT) 47 (H) 15 - 37 U/L Alk. phosphatase 112 45 - 117 U/L Protein, total 6.3 (L) 6.4 - 8.2 g/dL Albumin 3.1 (L) 3.5 - 5.0 g/dL Globulin 3.2 2.0 - 4.0 g/dL A-G Ratio 1.0 (L) 1.1 - 2.2 URINALYSIS W/ RFLX MICROSCOPIC Collection Time: 09/11/18  9:34 AM  
Result Value Ref Range Color YELLOW/STRAW Appearance CLOUDY (A) CLEAR Specific gravity 1.014 1.003 - 1.030    
 pH (UA) 5.0 5.0 - 8.0 Protein NEGATIVE  NEG mg/dL Glucose NEGATIVE  NEG mg/dL Ketone NEGATIVE  NEG mg/dL Bilirubin NEGATIVE  NEG  Blood NEGATIVE  NEG    
 Urobilinogen 0.2 0.2 - 1.0 EU/dL Nitrites NEGATIVE  NEG Leukocyte Esterase LARGE (A) NEG    
 WBC >100 (H) 0 - 4 /hpf  
 RBC 0-5 0 - 5 /hpf Epithelial cells FEW FEW /lpf Bacteria NEGATIVE  NEG /hpf MAGNESIUM Collection Time: 09/11/18  9:34 AM  
Result Value Ref Range Magnesium 3.2 (H) 1.6 - 2.4 mg/dL TYPE + CROSSMATCH Collection Time: 09/11/18 10:28 AM  
Result Value Ref Range Crossmatch Expiration 09/14/2018 ABO/Rh(D) B POSITIVE Antibody screen NEG Unit number [id number] Blood component type [facility] Unit division 00 Status of unit ALLOCATED Crossmatch result Compatible Unit number [id number] Blood component type [facility] Unit division 00 Status of unit ALLOCATED Crossmatch result Compatible LACTIC ACID Collection Time: 09/11/18 10:28 AM  
Result Value Ref Range Lactic acid 1.2 0.4 - 2.0 MMOL/L  
PROTHROMBIN TIME + INR Collection Time: 09/11/18 11:04 AM  
Result Value Ref Range INR 1.0 0.9 - 1.1 Prothrombin time 10.6 9.0 - 11.1 sec OCCULT BLOOD, STOOL Collection Time: 09/11/18 11:04 AM  
Result Value Ref Range  Occult blood, stool POSITIVE (A) NEG

## 2018-09-11 NOTE — IP AVS SNAPSHOT
Summary of Care Report The Summary of Care report has been created to help improve care coordination. Users with access to [employer] or [address] (Web-based application) may access additional patient information including the Discharge Summary. If you are not currently a [address] user and need more information, please call the number listed below in the [place] [address] section and ask to be connected with Medical Records. Facility Information Name Address Phone [address] [address] [phone] Patient Information Patient Name Sex  Jyoti Jose ([id number]) Female 1924 Discharge Information Admitting Provider Service Area Unit  
 Evelia Becerra MD / [phone] [address] [facility] [address]  / [phone] Discharge Provider Discharge Date/Time Discharge Disposition Destination (none) 2018 Afternoon (Pending) SNF (none) Patient Language Language ENGLISH [13] Hospital Problems as of 2018  Reviewed: 2018 10:07 AM by Nicole Lakhani MD  
  
  
  
 Class Noted - Resolved Last Modified POA Active Problems Dementia  2018 - Present 2018 by Nicole Lakhani MD Yes Entered by Evelia Becerra MD  
  
Non-Hospital Problems as of 2018  Reviewed: 2018 10:07 AM by Nicole Lakhani MD  
 None You are allergic to the following No active allergies Current Discharge Medication List  
  
START taking these medications Dose & Instructions Dispensing Information Comments  
 pantoprazole 40 mg tablet Commonly known as:  PROTONIX Dose:  40 mg Take 1 Tab by mouth two (2) times a day. Quantity:  60 Tab Refills:  0 CONTINUE these medications which have NOT CHANGED Dose & Instructions Dispensing Information Comments amLODIPine 10 mg tablet Commonly known as:  Love Breach Dose:  10 mg Take 10 mg by mouth daily. Refills:  0  
   
 aspirin delayed-release 81 mg tablet Dose:  81 mg Take 81 mg by mouth daily. Refills:  0  
   
 BENADRYL ALLERGY 25 mg tablet Generic drug:  diphenhydrAMINE Dose:  25 mg Take 25 mg by mouth nightly as needed (seasonal allergies). Refills:  0  
   
 cetirizine 10 mg tablet Commonly known as:  ZYRTEC Dose:  10 mg Take 10 mg by mouth daily. Refills:  0  
   
 cholecalciferol 1,000 unit tablet Commonly known as:  VITAMIN D3 Dose:  1000 Units Take 1,000 Units by mouth daily. Refills:  0 QUEtiapine 25 mg tablet Commonly known as:  SEROquel Dose:  25 mg Take 25 mg by mouth two (2) times a day. Refills:  0 STOP taking these medications Comments  
 losartan 50 mg tablet Commonly known as:  COZAAR Surgery Information ID Date/Time Status Primary Surgeon All Procedures Location [id number] 2018 Debbie Staley MD ESOPHAGOGASTRODUODENOSCOPY (EGD) [facility]    
 ESOPHAGOGASTRODUODENOSCOPY (EGD):  Consent from poa; ?1030   
 [id number] 2018 0830 Jaquelin Moody MD ESOPHAGOGASTRODUODENOSCOPY (EGD) ESOPHAGOGASTRODUODENAL (EGD) BIOPSY [facility] Follow-up Information Follow up With Details Comments Contact Info Provider Unknown   Patient not available to ask Discharge Instructions HOSPITALIST DISCHARGE INSTRUCTIONS 
 
NAME: Anuj Leon :  1924 MRN:  [id number] Date/Time:  2018 10:11 AM 
 
ADMIT DATE: 2018 DISCHARGE DATE: 2018 Attending Physician: Kristie Kim MD 
 
DISCHARGE DIAGNOSIS: 
Anemia Gastritis Medications: Per above medication reconciliation. Pain Management: per above medications Recommended diet: Dysphagia diet Recommended activity: Activity as tolerated Wound care: None Indwelling devices:  None Supplemental Oxygen: None Required Lab work: CBC in 1 week, BMP in 1 week. Glucose management:  None Code status: DNR Outside physician follow up: Follow-up Information Follow up With Details Comments Contact Info Provider Unknown   Patient not available to ask Follow with GI doctor in 2-4 weeks. Will be discharged home under the care of her daughter Information obtained by : 
I understand that if any problems occur once I am at home I am to contact my physician. I understand and acknowledge receipt of the instructions indicated above. Physician's or R.N.'s Signature                                                                  Date/Time Patient or Repres Chart Review Routing History Recipient Method Report Sent By Malu Gonzalez Provider Unknown, MD  
Patient not available to ask [address] IP Auto Routed Notes Janes Mullen MD [[id number]] 9/12/2018 12:51 AM 09/12/2018 Provider Unknown, MD  
Patient not available to ask [address] IP Auto Routed Notes Holly Hoyt MD [[id number]] 9/17/2018 10:19 AM 09/17/2018

## 2018-09-12 LAB
ABO + RH BLD: NORMAL
ALBUMIN SERPL-MCNC: 2.9 G/DL (ref 3.5–5)
ALBUMIN/GLOB SERPL: 1 {RATIO} (ref 1.1–2.2)
ALP SERPL-CCNC: 105 U/L (ref 45–117)
ALT SERPL-CCNC: 45 U/L (ref 12–78)
ANION GAP SERPL CALC-SCNC: 8 MMOL/L (ref 5–15)
AST SERPL-CCNC: 57 U/L (ref 15–37)
BACTERIA SPEC CULT: NORMAL
BASOPHILS # BLD: 0 K/UL (ref 0–0.1)
BASOPHILS NFR BLD: 0 % (ref 0–1)
BILIRUB SERPL-MCNC: 1.5 MG/DL (ref 0.2–1)
BLD PROD TYP BPU: NORMAL
BLD PROD TYP BPU: NORMAL
BLOOD GROUP ANTIBODIES SERPL: NORMAL
BPU ID: NORMAL
BPU ID: NORMAL
BUN SERPL-MCNC: 48 MG/DL (ref 6–20)
BUN/CREAT SERPL: 19 (ref 12–20)
CALCIUM SERPL-MCNC: 7.8 MG/DL (ref 8.5–10.1)
CC UR VC: NORMAL
CHLORIDE SERPL-SCNC: 122 MMOL/L (ref 97–108)
CO2 SERPL-SCNC: 21 MMOL/L (ref 21–32)
CREAT SERPL-MCNC: 2.51 MG/DL (ref 0.55–1.02)
CROSSMATCH RESULT,%XM: NORMAL
CROSSMATCH RESULT,%XM: NORMAL
DIFFERENTIAL METHOD BLD: ABNORMAL
EOSINOPHIL # BLD: 0.1 K/UL (ref 0–0.4)
EOSINOPHIL NFR BLD: 1 % (ref 0–7)
ERYTHROCYTE [DISTWIDTH] IN BLOOD BY AUTOMATED COUNT: 14.1 % (ref 11.5–14.5)
GLOBULIN SER CALC-MCNC: 2.9 G/DL (ref 2–4)
GLUCOSE BLD STRIP.AUTO-MCNC: 107 MG/DL (ref 65–100)
GLUCOSE BLD STRIP.AUTO-MCNC: 111 MG/DL (ref 65–100)
GLUCOSE BLD STRIP.AUTO-MCNC: 114 MG/DL (ref 65–100)
GLUCOSE BLD STRIP.AUTO-MCNC: 115 MG/DL (ref 65–100)
GLUCOSE BLD STRIP.AUTO-MCNC: 126 MG/DL (ref 65–100)
GLUCOSE SERPL-MCNC: 112 MG/DL (ref 65–100)
HCT VFR BLD AUTO: 28.1 % (ref 35–47)
HGB BLD-MCNC: 9.2 G/DL (ref 11.5–16)
IMM GRANULOCYTES # BLD: 0.1 K/UL (ref 0–0.04)
IMM GRANULOCYTES NFR BLD AUTO: 1 % (ref 0–0.5)
LYMPHOCYTES # BLD: 1 K/UL (ref 0.8–3.5)
LYMPHOCYTES NFR BLD: 16 % (ref 12–49)
MAGNESIUM SERPL-MCNC: 2.8 MG/DL (ref 1.6–2.4)
MCH RBC QN AUTO: 28.9 PG (ref 26–34)
MCHC RBC AUTO-ENTMCNC: 32.7 G/DL (ref 30–36.5)
MCV RBC AUTO: 88.4 FL (ref 80–99)
MONOCYTES # BLD: 0.6 K/UL (ref 0–1)
MONOCYTES NFR BLD: 10 % (ref 5–13)
NEUTS SEG # BLD: 4.5 K/UL (ref 1.8–8)
NEUTS SEG NFR BLD: 72 % (ref 32–75)
NRBC # BLD: 0.02 K/UL (ref 0–0.01)
NRBC BLD-RTO: 0.3 PER 100 WBC
PHOSPHATE SERPL-MCNC: 3.8 MG/DL (ref 2.6–4.7)
PLATELET # BLD AUTO: 120 K/UL (ref 150–400)
PMV BLD AUTO: 12.1 FL (ref 8.9–12.9)
POTASSIUM SERPL-SCNC: 4.3 MMOL/L (ref 3.5–5.1)
PROT SERPL-MCNC: 5.8 G/DL (ref 6.4–8.2)
RBC # BLD AUTO: 3.18 M/UL (ref 3.8–5.2)
RBC MORPH BLD: ABNORMAL
SERVICE CMNT-IMP: ABNORMAL
SERVICE CMNT-IMP: NORMAL
SODIUM SERPL-SCNC: 151 MMOL/L (ref 136–145)
SPECIMEN EXP DATE BLD: NORMAL
STATUS OF UNIT,%ST: NORMAL
STATUS OF UNIT,%ST: NORMAL
UNIT DIVISION, %UDIV: 0
UNIT DIVISION, %UDIV: 0
WBC # BLD AUTO: 6.3 K/UL (ref 3.6–11)

## 2018-09-12 PROCEDURE — 82962 GLUCOSE BLOOD TEST: CPT

## 2018-09-12 PROCEDURE — 97530 THERAPEUTIC ACTIVITIES: CPT

## 2018-09-12 PROCEDURE — 94760 N-INVAS EAR/PLS OXIMETRY 1: CPT

## 2018-09-12 PROCEDURE — 97161 PT EVAL LOW COMPLEX 20 MIN: CPT

## 2018-09-12 PROCEDURE — 74011250636 HC RX REV CODE- 250/636: Performed by: HOSPITALIST

## 2018-09-12 PROCEDURE — 80053 COMPREHEN METABOLIC PANEL: CPT | Performed by: HOSPITALIST

## 2018-09-12 PROCEDURE — 74011000250 HC RX REV CODE- 250: Performed by: HOSPITALIST

## 2018-09-12 PROCEDURE — 74011000258 HC RX REV CODE- 258: Performed by: HOSPITALIST

## 2018-09-12 PROCEDURE — C9113 INJ PANTOPRAZOLE SODIUM, VIA: HCPCS | Performed by: HOSPITALIST

## 2018-09-12 PROCEDURE — 65660000000 HC RM CCU STEPDOWN

## 2018-09-12 PROCEDURE — 36415 COLL VENOUS BLD VENIPUNCTURE: CPT | Performed by: HOSPITALIST

## 2018-09-12 PROCEDURE — 83735 ASSAY OF MAGNESIUM: CPT | Performed by: HOSPITALIST

## 2018-09-12 PROCEDURE — 84100 ASSAY OF PHOSPHORUS: CPT | Performed by: HOSPITALIST

## 2018-09-12 PROCEDURE — 85025 COMPLETE CBC W/AUTO DIFF WBC: CPT | Performed by: HOSPITALIST

## 2018-09-12 RX ADMIN — DEXTROSE MONOHYDRATE AND SODIUM CHLORIDE 125 ML/HR: 5; .45 INJECTION, SOLUTION INTRAVENOUS at 14:55

## 2018-09-12 RX ADMIN — Medication 10 ML: at 11:20

## 2018-09-12 RX ADMIN — Medication 10 ML: at 05:47

## 2018-09-12 RX ADMIN — SODIUM CHLORIDE 40 MG: 9 INJECTION, SOLUTION INTRAMUSCULAR; INTRAVENOUS; SUBCUTANEOUS at 21:14

## 2018-09-12 RX ADMIN — CEFTRIAXONE 1 G: 1 INJECTION, POWDER, FOR SOLUTION INTRAMUSCULAR; INTRAVENOUS at 08:35

## 2018-09-12 RX ADMIN — Medication 10 ML: at 14:59

## 2018-09-12 RX ADMIN — SODIUM CHLORIDE 40 MG: 9 INJECTION, SOLUTION INTRAMUSCULAR; INTRAVENOUS; SUBCUTANEOUS at 08:36

## 2018-09-12 RX ADMIN — Medication 10 ML: at 08:37

## 2018-09-12 RX ADMIN — DEXTROSE MONOHYDRATE AND SODIUM CHLORIDE 125 ML/HR: 5; .45 INJECTION, SOLUTION INTRAVENOUS at 05:47

## 2018-09-12 RX ADMIN — DEXTROSE MONOHYDRATE AND SODIUM CHLORIDE 125 ML/HR: 5; .45 INJECTION, SOLUTION INTRAVENOUS at 22:15

## 2018-09-12 RX ADMIN — Medication 10 ML: at 21:14

## 2018-09-12 NOTE — PROGRESS NOTES
Problem: Mobility Impaired (Adult and Pediatric) Goal: *Acute Goals and Plan of Care (Insert Text) Physical Therapy Goals Initiated 9/12/2018 1. Patient will move from supine to sit and sit to supine  in bed with minimal assistance/contact guard assist within 7 day(s). 2.  Patient will transfer from bed to chair and chair to bed with moderate assistance  using the least restrictive device within 7 day(s). 3.  Patient will perform sit to stand with minimal assistance/contact guard assist within 7 day(s). 4.  Patient will ambulate with moderate assistance  for 50 feet with the least restrictive device within 7 day(s). 5.  Patient will follow >60% 1 step functional commands within 7 days. physical Therapy EVALUATION Patient: Aminta Herrmann (89 y.o. female) Date: 9/12/2018 Primary Diagnosis: Delirium UTI (urinary tract infection) LAWANDA (acute kidney injury) ([facility] [address] Hypernatremia Acute blood loss anemia GI bleed Dementia Precautions: Hx agitation and biting ASSESSMENT : 
Based on the objective data described below, the patient presents with baseline dementia, impaired command following, impaired balance, strength, and poor functional independence following admission for UTI with increased confusion. Hx was limited during eval d/t patient confusion and unable to provide any meaningful hx and little prior hx in the chart. The chart indicates that she lives in a private assisted living and indicates no prior DME. Facilitated transfer to EOB with maximum assistance and additional time with simple descriptions planned actions (to limit agitation). Noted poor management of oral secretions and ? Intention tremors when asked to  the RW. Sit to stand with moderate assist x2 with RW use on left and hand hold assist on right d/t severe tremors. Patient unable to maintain stance or side step to return to bed and required maximum assist x2 for sit to supine d/t poor initiation of task herself. The chart indicates that this patient was ambulatory 2 days ago and appears below her baseline. Will follow to further assess and progress with cognitive improvement. Recommend discharge to SNF vs baseline LTC. Patient will benefit from skilled intervention to address the above impairments. Patients rehabilitation potential is considered to be Fair Factors which may influence rehabilitation potential include:  
[]         None noted 
[x]         Mental ability/status []         Medical condition 
[]         Home/family situation and support systems 
[]         Safety awareness 
[]         Pain tolerance/management 
[]         Other: PLAN : 
Recommendations and Planned Interventions: 
[x]           Bed Mobility Training             [x]    Neuromuscular Re-Education 
[x]           Transfer Training                   []    Orthotic/Prosthetic Training 
[x]           Gait Training                         []    Modalities [x]           Therapeutic Exercises           []    Edema Management/Control 
[x]           Therapeutic Activities            []    Patient and Family Training/Education 
[]           Other (comment): Frequency/Duration: Patient will be followed by physical therapy  3 times a week to address goals. Discharge Recommendations: Konstantin Pope vs prior level of care Further Equipment Recommendations for Discharge: None SUBJECTIVE:  
Patient stated noting contributory OBJECTIVE DATA SUMMARY:  
HISTORY:   
Past Medical History:  
Diagnosis Date  Dementia  Diabetes ([facility] [address]  Hypertension  Psychiatric disorder Anxiety History reviewed. No pertinent surgical history. Prior Level of Function/Home Situation: unknown Personal factors and/or comorbidities impacting plan of care: dementia Home Situation Home Environment: Assisted living One/Two Story Residence: One story Living Alone: No 
Support Systems: Child(luis fernando), Assisted living Patient Expects to be Discharged to[de-identified] Assisted living Current DME Used/Available at Home: None EXAMINATION/PRESENTATION/DECISION MAKING:  
Critical Behavior: 
Neurologic State: Confused Orientation Level: Disoriented to place, Disoriented to situation, Disoriented to time Cognition: Impaired decision making, Decreased command following, Impulsive Hearing: Auditory Auditory Impairment: Hard of hearing, right side Skin:   
Edema:  
Range Of Motion: 
AROM: Grossly decreased, non-functional 
  
  
  
  
  
  
  
Strength:   
Strength: Generally decreased, functional 
  
  
  
  
  
  
Tone & Sensation:  
Tone: Abnormal (muscular tremors, greatest with RUE attempting to trasnfer) Coordination: 
  
 
Functional Mobility: 
Bed Mobility: 
  
Supine to Sit: Maximum assistance Sit to Supine: Maximum assistance Transfers: 
Sit to Stand: Moderate assistance;Assist x2 Balance:  
Sitting: Impaired Sitting - Static: Fair (occasional) Sitting - Dynamic: Fair (occasional) Standing: Impaired Standing - Static: Poor Standing - Dynamic : Poor Functional Measure: 
Tinetti test: 
 
Sitting Balance: 1 Arises: 0 Attempts to Rise: 0 Immediate Standing Balance: 0 Standing Balance: 0 Nudged: 0 Eyes Closed: 0 Turn 360 Degrees - Continuous/Discontinuous: 0 Turn 360 Degrees - Steady/Unsteady: 0 Sitting Down: 0 Balance Score: 1 Indication of Gait: 0 
R Step Length/Height: 0 
L Step Length/Height: 0 
R Foot Clearance: 0 
L Foot Clearance: 0 Step Symmetry: 0 Step Continuity: 0 Path: 0 Trunk: 0 Walking Time: 0 Gait Score: 0 Total Score: 1 Tinetti Test and G-code impairment scale: 
Percentage of Impairment CH 
 
0% 
 CI 
 
1-19% CJ 
 
20-39% CK 
 
40-59% CL 
 
60-79% CM 
 
80-99% CN  
 
100% Tinetti Score 0-28 28 23-27 17-22 12-16 6-11 1-5 0 Tinetti Tool Score Risk of Falls 
<19 = High Fall Risk 19-24 = Moderate Fall Risk 25-28 = Low Fall Risk Tinetti ME. Performance-Oriented Assessment of Mobility Problems in Elderly Patients. [facility]; [id number]. (Scoring Description: PT Bulletin Feb. 10, 1993) Older adults: Abram Hunt et al, 2009; n = [address] [facility] elderly evaluated with ABC, EMRE, ADL, and IADL) · Mean EMRE score for males aged 69-68 years = 26.21(3.40) · Mean EMRE score for females age 69-68 years = 25.16(4.30) · Mean EMRE score for males over 80 years = 23.29(6.02) · Mean EMRE score for females over 80 years = 17.20(8.32) G codes: In compliance with CMSs Claims Based Outcome Reporting, the following G-code set was chosen for this patient based on their primary functional limitation being treated: The outcome measure chosen to determine the severity of the functional limitation was the Tinetti with a score of 1/28 which was correlated with the impairment scale. ? Mobility - Walking and Moving Around:  
  - CURRENT STATUS: CM - 80%-99% impaired, limited or restricted  - GOAL STATUS: CL - 60%-79% impaired, limited or restricted  - D/C STATUS:  ---------------To be determined--------------- Physical Therapy Evaluation Charge Determination History Examination Presentation Decision-Making MEDIUM  Complexity : 1-2 comorbidities / personal factors will impact the outcome/ POC  MEDIUM Complexity : 3 Standardized tests and measures addressing body structure, function, activity limitation and / or participation in recreation  LOW Complexity : Stable, uncomplicated  LOW Complexity : FOTO score of  Based on the above components, the patient evaluation is determined to be of the following complexity level: LOW Pain: 
Pain Scale 1: Numeric (0 - 10) Pain Intensity 1: 0 Activity Tolerance:  
 
Please refer to the flowsheet for vital signs taken during this treatment. After treatment:  
[]         Patient left in no apparent distress sitting up in chair [x]         Patient left in no apparent distress in bed 
[x]         Call bell left within reach [x]         Nursing notified 
[]         Caregiver present [x]         Bed alarm activated COMMUNICATION/EDUCATION:  
The patients plan of care was discussed with: Registered Nurse. [x]         Fall prevention education was provided  
[x]         Patient/family have participated as able in goal setting and plan of care. []         Patient/family agree to work toward stated goals and plan of care. []         Patient understands intent and goals of therapy, but is neutral about his/her participation. [x]         Patient is unable to participate in goal setting and plan of care. Thank you for this referral. 
Tyshawn Castanon, PT, DPT Time Calculation: 22 mins

## 2018-09-12 NOTE — PROGRESS NOTES
OT referral received, chart reviewed and patient screened for OT needs by speaking with Ivette Soto at [facility] where patient resides. Spoke with multiple family members who stated they were unable to provide prior level of function information. Phone number to [facility] provided by family. Per Ivette Soto the patient was total A for all ADLs, with the exception of being able to feed herself with supervision/setup, was total A for bed mobility in the morning due to being resistant, and was able to ambulate short distances with hand hold assist. The patient lives on the second floor and uses a stair lift up and down the steps. At this time the patient is not appropriate for OT services due to her overall total A baseline for ADLs. Unable to assess self feeding due to patient being NPO and Ivette Soto reporting that the patient will only feed herself if hungary and does not really follow commands. Will complete OT orders.

## 2018-09-12 NOTE — PROGRESS NOTES
Reason for Admission:   UTI RRAT Score:     21 Do you (patient/family) have any concerns for transition/discharge? no 
           
Plan for utilizing home health: May benefit Likelihood of readmission?   moderate Transition of Care Plan:      Pt admitted with UTI. I called and spoke with the caregiver, Ms Stevie Quan at [facility] on [address] [place] [address], [id number], cell [phone]//fax [phone] who states the pt had lived in the [facility], the dtr had her placed with [facility] more than 3 months ago, she is a DNR, the dtr [address] lives in [place]. [facility], the pt is \"estranged\" from her , she would like us to fax the pertinent chart info to her at d/c, and PCP should now be Dr Brian Tapia. I'll fax her the H and P for now. She will fax me the AD, DDNR, and insurance card info. Care Management Interventions Transition of Care Consult (CM Consult): Discharge Planning MyChart Signup: No 
Discharge Durable Medical Equipment: Yes Physical Therapy Consult: Yes Occupational Therapy Consult: Yes Speech Therapy Consult: No 
Current Support Network: Adult Group Home Confirm Follow Up Transport: Family Plan discussed with Pt/Family/Caregiver: Yes Freedom of Choice Offered: Yes Discharge Location Discharge Placement: Group home

## 2018-09-12 NOTE — PROGRESS NOTES
Bedside shift change report given to Freddie Briseno RN (oncoming nurse) by Brandy Ac RN (offgoing nurse). Report included the following information SBAR, Kardex, Intake/Output and Recent Results.

## 2018-09-12 NOTE — PROGRESS NOTES
Hospitalist Progress Note Hillary Orta MD. Cell: [phone] NAME:  Yeimy Vu :  1924 MRN:  [id number] Date of Service:  2018 Summary: 80 y.o. female who presented on 2018 with agitation and elevated BP. Assessment/Plan: 
Severe anemia hg 5.8 with Heme positive dark brown stool, POA concerning for GI bleed, ?upper since BUN elevated 64. 
- s/p 2 units of prbc transfusion. Post transfusion cbc 9.2 
- continue to monitor H/H 
- for EGD later today or tomorrow. Spoke to GI and [address] [place] [address] states she does not want colonoscopy if it comes to that. 
  
Hypernatremia Na 150 LAWANDA Cr 3.04, possibly due to dehydration along with ARB use 
--IVF with 1/2NS 
--No stone or hydronephrosis on CT abdomen 
- Continue IVF, repeat BMP in AM 
  
Possible uti with hypothermia, pyuria 
--no bacteria on urine.   
- No growth on urine culture. 
  
Acute metabolic encephalopathy in the setting of dementia Head CT scan unremarkable. Continue supportive care 
  
Scarring in right suprahilar on CTA chest.  Abnormal CXR with right suprahilar airspace disease 
--given rocephin and azithromycin in ER for possible PNA. However, CTA chest without lung mass or airspace disease. Abnormality on CXR due to atelectasis vs. Scar tissue. Not hypoxi or toxic looking. Antibiotics discontinued. 
  
HTN 
--reportedly /146 at assisted living. Has not taken meds yesterday or today. --BP normal here. Hold losartan due to lawanda 
--hold amlodipine due to npo. 
--IV labetalol prn 
  
DM type 2 
--low dose SSI. Not on meds at home. hb1c < 3.5 
- continue accu checks 
   
There is no height or weight on file to calculate BMI. 
  
Code: d/w with mPODUKE, DNR/DNI 
DVT prophylaxis: SCD Surrogate decision maker:  CORETTA العراقي lives in [place] [phone].  Marcos Senia [facility] paezfather) is not POA Code status: DNR 
DVT prophylaxsis: SCD Dispo: to be detremined Interval History/Subjective: 
F/u for GI bleed No acute overnight event. Patient is a poor historian Review of Systems: 
Review of systems not obtained due to patient factors. Objective: VITALS:  
Last 24hrs VS reviewed since prior progress note. Most recent are: 
Visit Vitals  /85  Pulse 70  Temp 96.4 °F (35.8 °C)  Resp 12  Wt 51.2 kg (112 lb 14 oz)  SpO2 97% Intake/Output Summary (Last 24 hours) at 09/12/18 1354 Last data filed at 09/12/18 0317 Gross per 24 hour Intake           1470.8 ml Output                0 ml Net           1470.8 ml PHYSICAL EXAM: 
General: No acute distress, cooperative, EENT: EOMI. Anicteric sclerae. Oral mucous moist, oropharynx benign Resp: CTA bilaterally. No wheezing/rhonchi/rales. No accessory muscle use CV: Regular rhythm, normal rate, no murmurs, gallops, rubs GI: Soft, non distended, non tender. normoactive bowel sounds, no hepatosplenomegaly Extremities: No edema, warm, 2+ pulses throughout Neurologic: Moves all extremities. dementia Psych: Good insight. Not anxious nor agitated. Skin: Good Turgor, no rashes or ulcers Lab Data Personally Reviewed: (see below) Medications list Personally Reviewed:  x YES  NO  
 
_______________________________________________________________________ Care Plan discussed with:  Patient/Family and Nurse Total NON critical care TIME:  30 minutes Evalina Castleman, MD  
 
Procedures: see electronic medical records for all procedures/Xrays and details which were not copied into this note but were reviewed prior to creation of Plan. LABS: 
Recent Labs  
   09/12/18 
 0008  09/11/18 
 1699 WBC  6.3  7.6 HGB  9.2*  5.8* HCT  28.1*  18.6*  
PLT  120*  138* Recent Labs  
   09/12/18 
 0008  09/11/18 
 5971 NA  151*  150* K  4.3  4.4  
CL  122*  118* CO2  21  25 BUN  48*  64* CREA  2.51*  3.04* GLU  112*  84  
CA  7.8*  8.5 MG  2.8*  3.2*  
PHOS  3.8   --   
 
Recent Labs  
   09/12/18 
 0008  09/11/18 
 0934 SGOT  57*  47* ALT  45  48 AP  105  112 TBILI  1.5*  0.4 TP  5.8*  6.3* ALB  2.9*  3.1*  
GLOB  2.9  3.2 Recent Labs  
   09/11/18 
 1104 INR  1.0 PTP  10.6 Recent Labs  
   09/11/18 
 1534 TIBC  308 PSAT  10* FERR  22 No results found for: FOL, RBCF No results for input(s): PH, PCO2, PO2 in the last 72 hours. Recent Labs  
   09/11/18 
 1534  09/11/18 
 1245 CPK   --   1270* CKNDX   --   2.1 TROIQ  <0.05   -- No results found for: CHOL, CHOLX, CHLST, CHOLV, HDL, LDL, LDLC, DLDLP, TGLX, TRIGL, TRIGP, CHHD, CHHDX Lab Results Component Value Date/Time Glucose (POC) 107 (H) 09/12/2018 11:27 AM  
 Glucose (POC) 126 (H) 09/12/2018 08:26 AM  
 Glucose (POC) 111 (H) 09/12/2018 05:46 AM  
 Glucose (POC) 130 (H) 09/11/2018 11:54 PM  
 Glucose (POC) 124 (H) 09/11/2018 06:17 PM  
 
Lab Results Component Value Date/Time  Color YELLOW/STRAW 09/11/2018 09:34 AM  
 Appearance CLOUDY (A) 09/11/2018 09:34 AM  
 Specific gravity 1.014 09/11/2018 09:34 AM  
 pH (UA) 5.0 09/11/2018 09:34 AM  
 Protein NEGATIVE  09/11/2018 09:34 AM  
 Glucose NEGATIVE  09/11/2018 09:34 AM  
 Ketone NEGATIVE  09/11/2018 09:34 AM  
 Bilirubin NEGATIVE  09/11/2018 09:34 AM  
 Urobilinogen 0.2 09/11/2018 09:34 AM  
 Nitrites NEGATIVE  09/11/2018 09:34 AM  
 Leukocyte Esterase LARGE (A) 09/11/2018 09:34 AM  
 Epithelial cells FEW 09/11/2018 09:34 AM  
 Bacteria NEGATIVE  09/11/2018 09:34 AM  
 WBC >100 (H) 09/11/2018 09:34 AM  
 RBC 0-5 09/11/2018 09:34 AM

## 2018-09-12 NOTE — PROGRESS NOTES
Bedside shift change report given to Nadege (oncoming nurse) by Vin Campbell (offgoing nurse). Report included the following information SBAR, Kardex, Intake/Output, MAR and Recent Results. Zone Phone for oncoming shift:   [phone] Shift Summary: Pt rested through night. No issues with pain. Pt very agitated when bothered. LDAs Peripheral IV 09/11/18 Left;Upper Arm (Active) Site Assessment Clean, dry, & intact 9/12/2018  3:22 AM  
Phlebitis Assessment 0 9/12/2018  3:22 AM  
Infiltration Assessment 0 9/12/2018  3:22 AM  
Dressing Status Clean, dry, & intact 9/12/2018  3:22 AM  
Dressing Type Transparent;Tape 9/12/2018  3:22 AM  
Hub Color/Line Status Pink;Flushed 9/12/2018  3:22 AM  
   
Peripheral IV 09/11/18 Right Antecubital (Active) Site Assessment Clean, dry, & intact 9/12/2018  3:22 AM  
Phlebitis Assessment 0 9/12/2018  3:22 AM  
Infiltration Assessment 0 9/12/2018  3:22 AM  
Dressing Status Clean, dry, & intact 9/12/2018  3:22 AM  
Dressing Type Transparent;Tape 9/12/2018  3:22 AM  
Hub Color/Line Status Pink; Infusing 9/12/2018  3:22 AM  
                    
Intake & Output Date 09/11/18 0700 - 09/12/18 6153 09/12/18 0700 - 09/13/18 8049 Shift 3951-8847 4364-1320 24 Hour Total 0700-1859 1900-0659 24 Hour Total  
I 
N 
T 
A 
K 
E 
 I.V. 
(mL/kg/hr)  1200 
(2) 1200 
(1) I.V.  1200 1200 Blood 270.8  270.8 Volume (TRANSFUSE PACKED RBC'S) 270.8  270.8 Shift Total 
(mL/kg) 270.8 
(5.3) 1200 
(23.4) 1470.8 
(28.7) O 
U T 
P 
U Prema Ross Urine (mL/kg/hr) Urine Occurrence(s)  2 x 2 x Shift Total 
(mL/kg) .8 1200 1470.8 Weight (kg) 51.2 51.2 51.2 51.2 51.2 51.2 Last Bowel Movement Glucose Checks [] N/A 
[] AC/HS [x] Q6 Concerns:  
Nutrition Active Orders Diet DIET NPO Consults [x]PT [x]OT []Speech [x]Case Management Cardiac Monitoring []N/A [x]Yes Expires:48 hrs

## 2018-09-13 LAB
ANION GAP SERPL CALC-SCNC: 5 MMOL/L (ref 5–15)
ANION GAP SERPL CALC-SCNC: 7 MMOL/L (ref 5–15)
BASOPHILS # BLD: 0 K/UL (ref 0–0.1)
BASOPHILS NFR BLD: 0 % (ref 0–1)
BUN SERPL-MCNC: 23 MG/DL (ref 6–20)
BUN SERPL-MCNC: 28 MG/DL (ref 6–20)
BUN/CREAT SERPL: 15 (ref 12–20)
BUN/CREAT SERPL: 15 (ref 12–20)
CALCIUM SERPL-MCNC: 7.8 MG/DL (ref 8.5–10.1)
CALCIUM SERPL-MCNC: 8.4 MG/DL (ref 8.5–10.1)
CHLORIDE SERPL-SCNC: 119 MMOL/L (ref 97–108)
CHLORIDE SERPL-SCNC: 122 MMOL/L (ref 97–108)
CO2 SERPL-SCNC: 23 MMOL/L (ref 21–32)
CO2 SERPL-SCNC: 24 MMOL/L (ref 21–32)
CREAT SERPL-MCNC: 1.52 MG/DL (ref 0.55–1.02)
CREAT SERPL-MCNC: 1.88 MG/DL (ref 0.55–1.02)
DIFFERENTIAL METHOD BLD: ABNORMAL
EOSINOPHIL # BLD: 0.2 K/UL (ref 0–0.4)
EOSINOPHIL NFR BLD: 2 % (ref 0–7)
ERYTHROCYTE [DISTWIDTH] IN BLOOD BY AUTOMATED COUNT: 14.8 % (ref 11.5–14.5)
GLUCOSE BLD STRIP.AUTO-MCNC: 109 MG/DL (ref 65–100)
GLUCOSE BLD STRIP.AUTO-MCNC: 120 MG/DL (ref 65–100)
GLUCOSE BLD STRIP.AUTO-MCNC: 125 MG/DL (ref 65–100)
GLUCOSE BLD STRIP.AUTO-MCNC: 98 MG/DL (ref 65–100)
GLUCOSE SERPL-MCNC: 101 MG/DL (ref 65–100)
GLUCOSE SERPL-MCNC: 119 MG/DL (ref 65–100)
HCT VFR BLD AUTO: 26.5 % (ref 35–47)
HGB BLD-MCNC: 8.6 G/DL (ref 11.5–16)
IMM GRANULOCYTES # BLD: 0.1 K/UL (ref 0–0.04)
IMM GRANULOCYTES NFR BLD AUTO: 1 % (ref 0–0.5)
LYMPHOCYTES # BLD: 1.4 K/UL (ref 0.8–3.5)
LYMPHOCYTES NFR BLD: 19 % (ref 12–49)
MCH RBC QN AUTO: 28.8 PG (ref 26–34)
MCHC RBC AUTO-ENTMCNC: 32.5 G/DL (ref 30–36.5)
MCV RBC AUTO: 88.6 FL (ref 80–99)
MONOCYTES # BLD: 0.7 K/UL (ref 0–1)
MONOCYTES NFR BLD: 9 % (ref 5–13)
NEUTS SEG # BLD: 5 K/UL (ref 1.8–8)
NEUTS SEG NFR BLD: 69 % (ref 32–75)
NRBC # BLD: 0.03 K/UL (ref 0–0.01)
NRBC BLD-RTO: 0.4 PER 100 WBC
PLATELET # BLD AUTO: 120 K/UL (ref 150–400)
PMV BLD AUTO: 11.9 FL (ref 8.9–12.9)
POTASSIUM SERPL-SCNC: 4.1 MMOL/L (ref 3.5–5.1)
POTASSIUM SERPL-SCNC: 4.4 MMOL/L (ref 3.5–5.1)
RBC # BLD AUTO: 2.99 M/UL (ref 3.8–5.2)
SERVICE CMNT-IMP: ABNORMAL
SERVICE CMNT-IMP: NORMAL
SODIUM SERPL-SCNC: 148 MMOL/L (ref 136–145)
SODIUM SERPL-SCNC: 152 MMOL/L (ref 136–145)
WBC # BLD AUTO: 7.3 K/UL (ref 3.6–11)

## 2018-09-13 PROCEDURE — 80048 BASIC METABOLIC PNL TOTAL CA: CPT | Performed by: HOSPITALIST

## 2018-09-13 PROCEDURE — 74011250636 HC RX REV CODE- 250/636: Performed by: HOSPITALIST

## 2018-09-13 PROCEDURE — C9113 INJ PANTOPRAZOLE SODIUM, VIA: HCPCS | Performed by: HOSPITALIST

## 2018-09-13 PROCEDURE — 74011000258 HC RX REV CODE- 258: Performed by: HOSPITALIST

## 2018-09-13 PROCEDURE — 82962 GLUCOSE BLOOD TEST: CPT

## 2018-09-13 PROCEDURE — 92610 EVALUATE SWALLOWING FUNCTION: CPT

## 2018-09-13 PROCEDURE — 85025 COMPLETE CBC W/AUTO DIFF WBC: CPT | Performed by: HOSPITALIST

## 2018-09-13 PROCEDURE — 74011000250 HC RX REV CODE- 250: Performed by: HOSPITALIST

## 2018-09-13 PROCEDURE — 65660000000 HC RM CCU STEPDOWN

## 2018-09-13 PROCEDURE — 36415 COLL VENOUS BLD VENIPUNCTURE: CPT | Performed by: HOSPITALIST

## 2018-09-13 PROCEDURE — 94760 N-INVAS EAR/PLS OXIMETRY 1: CPT

## 2018-09-13 RX ORDER — POLYETHYLENE GLYCOL 3350 17 G/17G
17 POWDER, FOR SOLUTION ORAL DAILY
Status: DISCONTINUED | OUTPATIENT
Start: 2018-09-13 | End: 2018-09-17 | Stop reason: HOSPADM

## 2018-09-13 RX ORDER — DEXTROSE MONOHYDRATE 50 MG/ML
75 INJECTION, SOLUTION INTRAVENOUS CONTINUOUS
Status: DISCONTINUED | OUTPATIENT
Start: 2018-09-13 | End: 2018-09-17 | Stop reason: HOSPADM

## 2018-09-13 RX ADMIN — Medication 10 ML: at 13:26

## 2018-09-13 RX ADMIN — Medication 10 ML: at 05:24

## 2018-09-13 RX ADMIN — DEXTROSE MONOHYDRATE AND SODIUM CHLORIDE 125 ML/HR: 5; .45 INJECTION, SOLUTION INTRAVENOUS at 05:35

## 2018-09-13 RX ADMIN — HALOPERIDOL LACTATE 2 MG: 5 INJECTION, SOLUTION INTRAMUSCULAR at 07:03

## 2018-09-13 RX ADMIN — DEXTROSE MONOHYDRATE 125 ML/HR: 5 INJECTION, SOLUTION INTRAVENOUS at 18:34

## 2018-09-13 RX ADMIN — DEXTROSE MONOHYDRATE 125 ML/HR: 5 INJECTION, SOLUTION INTRAVENOUS at 08:32

## 2018-09-13 RX ADMIN — SODIUM CHLORIDE 40 MG: 9 INJECTION, SOLUTION INTRAMUSCULAR; INTRAVENOUS; SUBCUTANEOUS at 21:18

## 2018-09-13 RX ADMIN — CEFTRIAXONE 1 G: 1 INJECTION, POWDER, FOR SOLUTION INTRAMUSCULAR; INTRAVENOUS at 08:25

## 2018-09-13 RX ADMIN — SODIUM CHLORIDE 40 MG: 9 INJECTION, SOLUTION INTRAMUSCULAR; INTRAVENOUS; SUBCUTANEOUS at 08:25

## 2018-09-13 NOTE — PROGRESS NOTES
F/U for anemia S: Ms. Emma Lim was seen by me today during rounds. At this time, she is resting + un comfortably. Nurses report she was thrashing around overnight. They also report no bleeding. Patient cannot give me pfsh, ros, cc or hpi due to her mental status. The patient has no new complaints today. Please see admission consult for details of ROS; there are no other changes today. O: Blood pressure 132/55, pulse 65, temperature 98.3 °F (36.8 °C), resp. rate 16, weight 51.2 kg (112 lb 14 oz), SpO2 97 %. Gen: Patient is in no acute distress. When I ask her what year it is she tells me to open the door. There is no jaundice. Lungs: Clear to auscultation bilaterally . Heart:+RRR. Abd: Soft, non tender, non-distended, bowel sounds present. Extremities: Warm. Cross sectional imagin. No evidence of genitourinary stone or hydronephrosis. 2. There are 2 right renal lesions, one hyperdense measuring 12 mm, the other 
hypodense measuring 18 mm. These are indeterminate without contrast material. 
Attempted further characterization with renal ultrasound can be performed on a 
nonemergent basis. 3. Moderate constipation. 4. Cardiomegaly. Lab Results Component Value Date/Time WBC 7.3 2018 12:34 AM  
 HGB 8.6 (L) 2018 12:34 AM  
 HCT 26.5 (L) 2018 12:34 AM  
 PLATELET 747 (L)  12:34 AM  
 MCV 88.6 2018 12:34 AM  
 
Lab Results Component Value Date/Time  Sodium 152 (H) 2018 12:34 AM  
 Potassium 4.4 2018 12:34 AM  
 Chloride 122 (H) 2018 12:34 AM  
 CO2 23 2018 12:34 AM  
 Anion gap 7 2018 12:34 AM  
 Glucose 119 (H) 2018 12:34 AM  
 BUN 28 (H) 2018 12:34 AM  
 Creatinine 1.88 (H) 2018 12:34 AM  
 BUN/Creatinine ratio 15 2018 12:34 AM  
 GFR est AA 30 (L) 2018 12:34 AM  
 GFR est non-AA 25 (L) 2018 12:34 AM  
 Calcium 7.8 (L) 2018 12:34 AM  
 Bilirubin, total 1.5 (H) 09/12/2018 12:08 AM  
 AST (SGOT) 57 (H) 09/12/2018 12:08 AM  
 Alk. phosphatase 105 09/12/2018 12:08 AM  
 Protein, total 5.8 (L) 09/12/2018 12:08 AM  
 Albumin 2.9 (L) 09/12/2018 12:08 AM  
 Globulin 2.9 09/12/2018 12:08 AM  
 A-G Ratio 1.0 (L) 09/12/2018 12:08 AM  
 ALT (SGPT) 45 09/12/2018 12:08 AM  
 
 
 
A: Active Problems: 
  Acute blood loss anemia (9/11/2018) Delirium (9/11/2018) Hypernatremia (9/11/2018) UTI (urinary tract infection) (9/11/2018) GI bleed (9/11/2018) Dementia (9/11/2018) LAWANDA (acute kidney injury) ([facility] [address] (9/11/2018) Comment:  staboe but sodium is a concern P:  egd today if ok with anesthesia. I spoke to Dorie Callahan by phone and discussed objectives, risks, consequences and alternatives. Tomorrow ok with her Due to abnormal ct showing constipation, when we feed her I recommend aggressive miralax Dorie Callahan is aware that I am on vacation tomorrow and a partner will see Ms Carolyn Justice. Kayla Ny MD 
6:56 AM 
9/13/2018

## 2018-09-13 NOTE — PROGRESS NOTES
Dtr wanted to complete DDNR. Left in [facility] for MD-dtr has signed it. Pt has Medicare//no secondary per dtr. I copied her card and emailed it to Case in [place] to upload into [place]. The pt will return to [facility] at d/c. ?EGD on Friday.

## 2018-09-13 NOTE — PROGRESS NOTES
Physical Therapy Attempting to see patient for PT this am. Nursing requesting therapy be deferred today secondary to increased confusion. Will follow back tomorrow. Thank you, David Armenta, PT

## 2018-09-13 NOTE — CONSULTS
Gastroenterology Consult     Referring Physician: Drew Sever  PCP:  PROVIDER UNKNOWN  Gastroenterologist:  [facility]; no rga chart    Consult Date: 9/12/2018     Subjective:     Chief Complaint: anemia, ? bleeding    History of Present Illness: Tai Schneider is a 80 y.o. female who is seen in consultation for the above issues. I cannot get a cc, hpi, pfsh or ros from her. I reviewed the note of PRAMOD Tijerina. I spoke to Dr Giovanni Carreno about 1300 today. .she presented wth agitation, a sensatino from her daughter that she was lethargic, mental status chagnes and anemia    Stool was brown and heme +    Past Medical History:   Diagnosis Date    Dementia     Diabetes ([facility] [address]     Hypertension     Psychiatric disorder     Anxiety     History reviewed. No pertinent surgical history. History reviewed. No pertinent family history.   Social History   Substance Use Topics    Smoking status: Not on file    Smokeless tobacco: Not on file    Alcohol use Not on file      No Known Allergies  Current Facility-Administered Medications   Medication Dose Route Frequency    0.9% sodium chloride infusion 250 mL  250 mL IntraVENous PRN    sodium chloride (NS) flush 5-10 mL  5-10 mL IntraVENous Q8H    sodium chloride (NS) flush 5-10 mL  5-10 mL IntraVENous PRN    dextrose 5 % - 0.45% NaCl infusion  125 mL/hr IntraVENous CONTINUOUS    acetaminophen (TYLENOL) suppository 650 mg  650 mg Rectal Q6H PRN    ondansetron (ZOFRAN) injection 4 mg  4 mg IntraVENous Q6H PRN    insulin lispro (HUMALOG) injection   SubCUTAneous Q6H    glucose chewable tablet 16 g  4 Tab Oral PRN    dextrose (D50W) injection syrg 12.5-25 g  12.5-25 g IntraVENous PRN    glucagon (GLUCAGEN) injection 1 mg  1 mg IntraMUSCular PRN    cefTRIAXone (ROCEPHIN) 1 g in 0.9% sodium chloride (MBP/ADV) 50 mL  1 g IntraVENous Q24H    pantoprazole (PROTONIX) 40 mg in sodium chloride 0.9% 10 mL injection  40 mg IntraVENous Q12H    haloperidol lactate (HALDOL) injection 2 mg  2 mg IntraVENous Q6H PRN    0.9% sodium chloride infusion 250 mL  250 mL IntraVENous PRN        Review of Systems:  A detailed 10 organ review of systems cannot be ottained due to patient's mental status     Objective:     Physical Exam:  Visit Vitals    /48 (BP 1 Location: Right leg)    Pulse (!) 57    Temp 97.2 °F (36.2 °C)    Resp 18    Wt 51.2 kg (112 lb 14 oz)    SpO2 99%      Gen:  Alet, but not communicative. Flexion contractures present. Skin:  Extremities and face reveal no rashes. HEENT:   Could not get the patient to cooperate for eam Cardiovascular: Regular rate and rhythm. No murmurs, gallops, or rubs. PMI nondisplaced. Carotids without bruits. Respiratory:  Comfortable breathing with no accessory muscle use. Clear breath sounds with no wheezes, rales, or rhonchi. GI:  Abdomen nondistended, soft, and nontender. Normal active bowel sounds. No enlargement of the liver or spleen. No masses palpable. Rectal:  Deferred  Musculoskeletal:  No pitting edema of the lower legs. Extremities have flexion contractures. Neurological:  Not oriented    Psychiatric:  Cannot assess   Lymphatic:  No cervical or supraclavicular adenopathy.     Lab/Data Review:  Recent Labs      09/12/18   0008  09/11/18   0934   WBC  6.3  7.6   HGB  9.2*  5.8*   HCT  28.1*  18.6*   PLT  120*  138*   Nice response to 2 unit transfusion  Recent Labs      09/12/18   0008  09/11/18   0934   NA  151*  150*   K  4.3  4.4   CL  122*  118*   CO2  21  25   BUN  48*  64*   CREA  2.51*  3.04*   GLU  112*  84   CA  7.8*  8.5   MG  2.8*  3.2*   PHOS  3.8   --      Recent Labs      09/12/18   0008  09/11/18   0934   SGOT  57*  47*   ALT  45  48   AP  105  112   TBILI  1.5*  0.4   TP  5.8*  6.3*   ALB  2.9*  3.1*   GLOB  2.9  3.2     Recent Labs      09/11/18   1104   INR  1.0   PTP  10.6      Recent Labs      09/11/18   1534   TIBC  308   PSAT  10*   FERR  22      No results found for: FOL, RBCF   No results for input(s): PH, PCO2, PO2 in the last 72 hours. Recent Labs      09/11/18   1534  09/11/18   1245   CPK   --   1270*   CKNDX   --   2.1   TROIQ  <0.05   --      No results found for: CHOL, CHOLX, CHLST, CHOLV, HDL, LDL, LDLC, DLDLP, TGLX, TRIGL, TRIGP, CHHD, CHHDX  Lab Results   Component Value Date/Time    Glucose (POC) 115 (H) 09/12/2018 05:38 PM    Glucose (POC) 114 (H) 09/12/2018 03:46 PM    Glucose (POC) 107 (H) 09/12/2018 11:27 AM    Glucose (POC) 126 (H) 09/12/2018 08:26 AM    Glucose (POC) 111 (H) 09/12/2018 05:46 AM     Lab Results   Component Value Date/Time    Color YELLOW/STRAW 09/11/2018 09:34 AM    Appearance CLOUDY (A) 09/11/2018 09:34 AM    Specific gravity 1.014 09/11/2018 09:34 AM    pH (UA) 5.0 09/11/2018 09:34 AM    Protein NEGATIVE  09/11/2018 09:34 AM    Glucose NEGATIVE  09/11/2018 09:34 AM    Ketone NEGATIVE  09/11/2018 09:34 AM    Bilirubin NEGATIVE  09/11/2018 09:34 AM    Urobilinogen 0.2 09/11/2018 09:34 AM    Nitrites NEGATIVE  09/11/2018 09:34 AM    Leukocyte Esterase LARGE (A) 09/11/2018 09:34 AM    Epithelial cells FEW 09/11/2018 09:34 AM    Bacteria NEGATIVE  09/11/2018 09:34 AM    WBC >100 (H) 09/11/2018 09:34 AM    RBC 0-5 09/11/2018 09:34 AM           Assessment/Plan:     Active Problems:    Acute blood loss anemia (9/11/2018)      Delirium (9/11/2018)      Hypernatremia (9/11/2018)      UTI (urinary tract infection) (9/11/2018)      GI bleed (9/11/2018)      Dementia (9/11/2018)      LAWANDA (acute kidney injury) ([facility] [address] (9/11/2018)         I agree with Dr. Rupa Barriga. I think she bled several days ago, bumped her creatinine. No evidence of active bleeding    I will do egd tomorrow  Dr Juliann Hoover spoke to family about their wishes.   They desire egd and do not deiser colonoscopy  She may have had one recently in [address] Yamilet Chery MD  10:06 PM  9/12/2018

## 2018-09-13 NOTE — PROGRESS NOTES
Problem: Falls - Risk of 
Goal: *Absence of Falls Document Edgar Brunner Fall Risk and appropriate interventions in the flowsheet. Outcome: Progressing Towards Goal 
Fall Risk Interventions: 
Mobility Interventions: Bed/chair exit alarm Mentation Interventions: Bed/chair exit alarm, Door open when patient unattended, More frequent rounding, Reorient patient, Toileting rounds Elimination Interventions: Call light in reach, Toileting schedule/hourly rounds, Bed/chair exit alarm

## 2018-09-13 NOTE — PROGRESS NOTES
Bedside shift change report given to Nadege (oncoming nurse) by Joe Falcon (offgoing nurse). Report included the following information SBAR, Kardex, Intake/Output, MAR and Recent Results. Zone Phone for oncoming shift:   [phone] Shift Summary: Pt restless through night. Pulling at lines. No c/o pain. LDAs Peripheral IV 09/11/18 Left;Upper Arm (Active) Site Assessment Clean, dry, & intact 9/12/2018 11:43 PM  
Phlebitis Assessment 0 9/12/2018 11:43 PM  
Infiltration Assessment 0 9/12/2018 11:43 PM  
Dressing Status Clean, dry, & intact 9/12/2018 11:43 PM  
Dressing Type Transparent;Tape 9/12/2018 11:43 PM  
Hub Color/Line Status Pink;Flushed 9/12/2018 11:43 PM  
Alcohol Cap Used Yes 9/12/2018  3:00 PM  
                    
Intake & Output Date 09/12/18 0700 - 09/13/18 9165 09/13/18 0700 - 09/14/18 2164 Shift 9033-2524 0870-3067 24 Hour Total 3505-4918 7751-5689 24 Hour Total  
I 
N 
T 
A 
K 
E 
 I.V. 
(mL/kg/hr)  5256.3 5256.3 I.V.  1500 1500 Volume (dextrose 5 % - 0.45% NaCl infusion)  3706.3 3706.3 Volume (cefTRIAXone (ROCEPHIN) 1 g in 0.9% sodium chloride (MBP/ADV) 50 mL)  50 50 Shift Total 
(mL/kg)  5256.3 
(102.7) 5256.3 
(102.7) O 
U T 
P 
U [facility] Urine (mL/kg/hr) Urine Occurrence(s) 3 x 1 x 4 x Shift Total 
(mL/kg) NET  5256.3 5256.3 Weight (kg) 51.2 51.2 51.2 51.2 51.2 51.2 Last Bowel Movement Last Bowel Movement Date: 09/11/18 Glucose Checks [] N/A 
[] AC/HS [x] Q6 Concerns:  
Nutrition Active Orders Diet DIET NPO Consults [x]PT [x]OT []Speech [x]Case Management Cardiac Monitoring []N/A [x]Yes Expires:

## 2018-09-13 NOTE — CDMP QUERY
Dr. Love No : 
Please clarify if this patient is (was) being treated/managed for:  
 
=> ABLA in setting of +heme stools, H/H 5.8/18 
=> Other explanation of clinical findings 
=> Clinically Undetermined (no explanation for clinical findings) The medical record reflects the following clinical findings, treatment, and risk factors. Risk Factors:  Presents for severe anemia hgb 5.8 Clinical Indicators:  +heme stool, LAWANDA, UTI, Acute delirium Treatment: transfusion ordered of 2 units, serial labs Please clarify and document your clinical opinion in the progress notes and discharge summary including the definitive and/or presumptive diagnosis, (suspected or probable), related to the above clinical findings. Please include clinical findings supporting your diagnosis. Thank Priscilla Benavides  [address]; MRO;[id number]

## 2018-09-13 NOTE — PROGRESS NOTES
Hospitalist Progress Note Candice Joseph MD. Cell: [phone] NAME:  Karma Crowe :  [id number] MRN:  [id number] Date of Service:  2018 Summary: 80 y.o. female who presented on 2018 with agitation and elevated BP. Assessment/Plan: 
Severe anemia hg 5.8 with Heme positive dark brown stool, POA concerning for GI bleed, ?upper since BUN elevated 64. 
- s/p 2 units of prbc transfusion. Post transfusion cbc 9.2 
- continue to monitor H/H 
- Consent obtained for EGD, likely tomorrow. . 
- mpoa states she does not want colonoscopy if it comes to that. 
- H/h stable 
  
Hypernatremia Na 150 MASTER Cr 3.04, possibly due to dehydration along with ARB use 
--IVF with 1/2NS 
--No stone or hydronephrosis on CT abdomen 
- renal indices improving. Switch Dextrose saline to dextrose water due to worsening hypernatremia.  
  
Possible uti with hypothermia, pyuria 
--no bacteria on urine.   
- No growth on urine culture. S/p one dose of Rocephin. 
  
Acute metabolic encephalopathy in the setting of dementia Head CT scan unremarkable. Continue supportive care 
  
Scarring in right suprahilar on CTA chest.  Abnormal CXR with right suprahilar airspace disease 
--given rocephin and azithromycin in ER for possible PNA. However, CTA chest without lung mass or airspace disease. Abnormality on CXR due to atelectasis vs. Scar tissue. Not hypoxi or toxic looking. Antibiotics discontinued. 
  
HTN 
--reportedly /146 at assisted living. Has not taken meds yesterday or today. --BP normal here. Hold losartan due to master 
--hold amlodipine due to npo. 
--IV labetalol prn 
  
DM type 2 
--low dose SSI. Not on meds at home. hb1c < 3.5 
- continue accu checks. Monitor closely for hypoglycemia Acute metabolic encephalopathy in the setting of dementia. She is agitated and trying to get out of bed 
- fall precautions. Continue haldol as needed Keep NPO until cleared by SLP 
   
There is no height or weight on file to calculate BMI. 
  
Code: d/w with mPOA, DNR/DNI 
DVT prophylaxis: SCD Surrogate decision maker:  POA daughter Shameka Block lives in [facility] [phone].  Mila Rosa [facility] stepfather) is not POA Code status: DNR 
DVT prophylaxsis: SCD Dispo: to be detremined Interval History/Subjective: 
F/u for GI bleed No acute overnight event. Patient is a poor historian Agitated and trying to climb out of bed. Review of Systems: 
Review of systems not obtained due to patient factors. Objective: VITALS:  
Last 24hrs VS reviewed since prior progress note. Most recent are: 
Visit Vitals  BP (!) 140/124 (BP 1 Location: Left arm, BP Patient Position: At rest)  Pulse 65  Temp 97.5 °F (36.4 °C)  Resp 16  Wt 51.2 kg (112 lb 14 oz)  SpO2 100% Intake/Output Summary (Last 24 hours) at 09/13/18 1203 Last data filed at 09/13/18 9170 Gross per 24 hour Intake          5256.25 ml Output                0 ml Net          5256.25 ml PHYSICAL EXAM: 
General: agitated and muttering incomprehensible words EENT: EOMI. Anicteric sclerae. Oral mucous moist, oropharynx benign Resp: CTA bilaterally. No wheezing/rhonchi/rales. No accessory muscle use CV: Regular rhythm, normal rate, no murmurs, gallops, rubs GI: Soft, non distended, non tender. normoactive bowel sounds, no hepatosplenomegaly Extremities: No edema, warm, 2+ pulses throughout Neurologic: Moves all extremities. Not oriented to name, place or time. Psych: Good insight. Not anxious nor agitated. Skin: Good Turgor, no rashes or ulcers Lab Data Personally Reviewed: (see below) Medications list Personally Reviewed:  x YES  NO  
 
_______________________________________________________________________ Care Plan discussed with:  Patient/Family and Nurse Total NON critical care TIME:  30 minutes Nakul Dalal MD  
 
Procedures: see electronic medical records for all procedures/Xrays and details which were not copied into this note but were reviewed prior to creation of Plan. LABS: 
Recent Labs  
   09/13/18 
 0034  09/12/18 
 0008 WBC  7.3  6.3 HGB  8.6*  9.2* HCT  26.5*  28.1*  
PLT  120*  120* Recent Labs  
   09/13/18 
 0034  09/12/18 
 0008  09/11/18 
 8823 NA  152*  151*  150*  
K  4.4  4.3  4.4  
CL  122*  122*  118* CO2  23  21  25 BUN  28*  48*  64* CREA  1.88*  2.51*  3.04* GLU  119*  112*  84  
CA  7.8*  7.8*  8.5 MG   --   2.8*  3.2*  
PHOS   --   3.8   --   
 
Recent Labs  
   09/12/18 
 0008  09/11/18 
 0934 SGOT  57*  47* ALT  45  48 AP  105  112 TBILI  1.5*  0.4 TP  5.8*  6.3* ALB  2.9*  3.1*  
GLOB  2.9  3.2 Recent Labs  
   09/11/18 
 1104 INR  1.0 PTP  10.6 Recent Labs  
   09/11/18 
 1534 TIBC  308 PSAT  10* FERR  22 No results found for: FOL, RBCF No results for input(s): PH, PCO2, PO2 in the last 72 hours. Recent Labs  
   09/11/18 
 1534  09/11/18 
 1245 CPK   --   1270* CKNDX   --   2.1 TROIQ  <0.05   -- No results found for: CHOL, CHOLX, CHLST, CHOLV, HDL, LDL, LDLC, DLDLP, TGLX, TRIGL, TRIGP, CHHD, CHHDX Lab Results Component Value Date/Time Glucose (POC) 98 09/13/2018 05:34 AM  
 Glucose (POC) 125 (H) 09/13/2018 12:25 AM  
 Glucose (POC) 115 (H) 09/12/2018 05:38 PM  
 Glucose (POC) 114 (H) 09/12/2018 03:46 PM  
 Glucose (POC) 107 (H) 09/12/2018 11:27 AM  
 
Lab Results Component Value Date/Time  Color YELLOW/STRAW 09/11/2018 09:34 AM  
 Appearance CLOUDY (A) 09/11/2018 09:34 AM  
 Specific gravity 1.014 09/11/2018 09:34 AM  
 pH (UA) 5.0 09/11/2018 09:34 AM  
 Protein NEGATIVE  09/11/2018 09:34 AM  
 Glucose NEGATIVE  09/11/2018 09:34 AM  
 Ketone NEGATIVE  09/11/2018 09:34 AM  
 Bilirubin NEGATIVE  09/11/2018 09:34 AM  
 Urobilinogen 0.2 09/11/2018 09:34 AM  
 Nitrites NEGATIVE  09/11/2018 09:34 AM  
 Leukocyte Esterase LARGE (A) 09/11/2018 09:34 AM  
 Epithelial cells FEW 09/11/2018 09:34 AM  
 Bacteria NEGATIVE  09/11/2018 09:34 AM  
 WBC >100 (H) 09/11/2018 09:34 AM  
 RBC 0-5 09/11/2018 09:34 AM

## 2018-09-13 NOTE — PROGRESS NOTES
Bedside shift change report given to Tammie Mccormack RN (oncoming nurse) by Meghann Simmons RN (offgoing nurse). Report included the following information SBAR, Kardex, Intake/Output, MAR and Recent Results.

## 2018-09-13 NOTE — PROGRESS NOTES
Bedside and Verbal shift change report given to Patience Murillo (oncoming nurse) by CINTIA Botello RN (offgoing nurse). Report given with SBAR, Kardex, Intake/Output, MAR and Recent Results.

## 2018-09-13 NOTE — PROGRESS NOTES
Problem: Dysphagia (Adult) Goal: *Acute Goals and Plan of Care (Insert Text) Speech pathology goals initiated 9/13/2018 1. Patient will participate in swallow re-assessment within 7 days Speech LAnguage Pathology bedside swallow evaluation Patient: Verna January (62 y.o. female) Date: 9/13/2018 Primary Diagnosis: Delirium UTI (urinary tract infection) LAWANDA (acute kidney injury) ([facility] [address] Hypernatremia Acute blood loss anemia GI bleed Dementia 
anemia, ?blood loss Procedure(s) (LRB): ESOPHAGOGASTRODUODENOSCOPY (EGD) (N/A) Precautions: aspiration ASSESSMENT : 
Patient lethargic but opened eyes and verbalized to verbal stimuli. She is oriented to self only, follows no command and has most unintelligible speech. Based on the objective data described below, the patient presents with severe oral dysphagia characterized by poor object recognition with no active acceptance from spoon or straw despite verbal cues and tactile stimulation on lips. Patient agreed to a drink but when straw was presented, she proceeded to bite down on straw and have groping mouth movements. No attempt made to actively suck. Patient too confused to attempt to self feed from cup. Presented muffin from breakfast tray to patient hands and lips as well to see if patient would accept this but again made no attempt to accept. High risk of aspiration given AMS. Patient will benefit from skilled intervention to address the above impairments. Patients rehabilitation potential is considered to be Guarded Factors which may influence rehabilitation potential include:  
[]            None noted [x]            Mental ability/status []            Medical condition []            Home/family situation and support systems 
[]            Safety awareness 
[]            Pain tolerance/management []            Other: PLAN : 
Recommendations and Planned Interventions: 
--Strict NPO. Alternative med route -- note npo after midnight for possible EGD tomorrow. SLP will f/u tomorrow pending testing Frequency/Duration: Patient will be followed by speech-language pathology 3 times a week to address goals. Discharge Recommendations: To Be Determined SUBJECTIVE:  
Patient drowsy, confused. OBJECTIVE:  
 
Past Medical History:  
Diagnosis Date  Dementia  Diabetes ([facility] [address]  Hypertension  Psychiatric disorder Anxiety History reviewed. No pertinent surgical history. Prior Level of Function/Home Situation: Home Situation Home Environment: Assisted living One/Two Story Residence: One story Living Alone: No 
Support Systems: Child(luis fernando), Assisted living Patient Expects to be Discharged to[de-identified] Assisted living Current DME Used/Available at Home: None Diet prior to admission:unknown Current Diet:  Regular/thin   
Cognitive and Communication Status: 
Neurologic State: Lethargic, Confused Orientation Level: Oriented to person Cognition: No command following Perseveration: Perseverates during conversation Oral Assessment: 
Oral Assessment Labial:  (no command following) Dentition: Natural;Limited Oral Hygiene:  (clean, dry) Mandible: No impairment (under bite) P.O. Trials: 
Patient Position:  (semi up in bed) Vocal quality prior to P.O.: No impairment Consistency Presented: Thin liquid;Puree;Mechanical soft How Presented: SLP-fed/presented;Spoon;Straw Bolus Acceptance: Absent Oral Phase Severity: Severe Pharyngeal Phase Severity :  (unable to assess) NOMS:  
The NOMS functional outcome measure was used to quantify this patient's level of swallowing impairment. Based on the NOMS, the patient was determined to be at level 1 for swallow function G Codes: In compliance with CMSs Claims Based Outcome Reporting, the following G-code set was chosen for this patient based the use of the NOMS functional outcome to quantify this patient's level of swallowing impairment. Using the NOMS, the patient was determined to be at level 1 for swallow function which correlates with the CN= 100% level of severity. Based on the objective assessment provided within this note, the current, goal, and discharge g-codes are as follows: 
 
Swallow  Swallowing: 
 Swallow Current Status CN= 100%  Swallow Goal Status CL= 60-79% NOMS Swallowing Levels: 
Level 1 (CN): NPO Level 2 (CM): NPO but takes consistency in therapy Level 3 (CL): Takes less than 50% of nutrition p.o. and continues with nonoral feedings; and/or safe with mod cues; and/or max diet restriction Level 4 (CK): Safe swallow but needs mod cues; and/or mod diet restriction; and/or still requires some nonoral feeding/supplements Level 5 (CJ): Safe swallow with min diet restriction; and/or needs min cues Level 6 (CI): Independent with p.o.; rare cues; usually self cues; may need to avoid some foods or needs extra time Level 7 (CH): Independent for all p.o. VALERIO. (2003). National Outcomes Measurement System (NOMS): Adult Speech-Language Pathology User's Guide. Pain: 
Pain Scale 1: Numeric (0 - 10) Pain Intensity 1: 0 After treatment:  
[]            Patient left in no apparent distress sitting up in chair 
[x]            Patient left in no apparent distress in bed 
[x]            Call bell left within reach [x]            Nursing notified 
[]            Caregiver present 
[]            Bed alarm activated COMMUNICATION/EDUCATION:  
The patients plan of care including recommendations, planned interventions, and recommended diet changes were discussed with: Registered Nurse. []            Posted safety precautions in patient's room. []            Patient/family have participated as able in goal setting and plan of care. []            Patient/family agree to work toward stated goals and plan of care.  
[]            Patient understands intent and goals of therapy, but is neutral about his/her participation. [x]            Patient is unable to participate in goal setting and plan of care. Thank you for this referral. 
LUIS Norman Time Calculation: 16 mins

## 2018-09-13 NOTE — PROGRESS NOTES
Patient is confused, agitated combative. Trying to get out from the bed and pull IV out. Mitten applied to right hand to protect IV from pulling off.

## 2018-09-13 NOTE — PROGRESS NOTES
Lab Results Component Value Date/Time Sodium 152 (H) 09/13/2018 12:34 AM  
 Potassium 4.4 09/13/2018 12:34 AM  
 Chloride 122 (H) 09/13/2018 12:34 AM  
 CO2 23 09/13/2018 12:34 AM  
 Anion gap 7 09/13/2018 12:34 AM  
 Glucose 119 (H) 09/13/2018 12:34 AM  
 BUN 28 (H) 09/13/2018 12:34 AM  
 Creatinine 1.88 (H) 09/13/2018 12:34 AM  
 BUN/Creatinine ratio 15 09/13/2018 12:34 AM  
 GFR est AA 30 (L) 09/13/2018 12:34 AM  
 GFR est non-AA 25 (L) 09/13/2018 12:34 AM  
 Calcium 7.8 (L) 09/13/2018 12:34 AM  
 
 
 
Sodium of 152 is contra indication to anesthesia per anesthesia We will give her another day to improve metabolically and do egd tomorrow Lulu Beckwith MD 
7:28 AM 
9/13/2018

## 2018-09-14 ENCOUNTER — ANESTHESIA EVENT (OUTPATIENT)
Dept: ENDOSCOPY | Age: 83
DRG: 377 | End: 2018-09-14
Payer: MEDICARE

## 2018-09-14 ENCOUNTER — ANESTHESIA (OUTPATIENT)
Dept: ENDOSCOPY | Age: 83
DRG: 377 | End: 2018-09-14
Payer: MEDICARE

## 2018-09-14 LAB
ANION GAP SERPL CALC-SCNC: 6 MMOL/L (ref 5–15)
BUN SERPL-MCNC: 18 MG/DL (ref 6–20)
BUN/CREAT SERPL: 13 (ref 12–20)
CALCIUM SERPL-MCNC: 8.5 MG/DL (ref 8.5–10.1)
CHLORIDE SERPL-SCNC: 113 MMOL/L (ref 97–108)
CO2 SERPL-SCNC: 23 MMOL/L (ref 21–32)
CREAT SERPL-MCNC: 1.4 MG/DL (ref 0.55–1.02)
GLUCOSE BLD STRIP.AUTO-MCNC: 113 MG/DL (ref 65–100)
GLUCOSE BLD STRIP.AUTO-MCNC: 116 MG/DL (ref 65–100)
GLUCOSE BLD STRIP.AUTO-MCNC: 130 MG/DL (ref 65–100)
GLUCOSE BLD STRIP.AUTO-MCNC: 154 MG/DL (ref 65–100)
GLUCOSE BLD STRIP.AUTO-MCNC: 94 MG/DL (ref 65–100)
GLUCOSE SERPL-MCNC: 139 MG/DL (ref 65–100)
H PYLORI FROM TISSUE: NEGATIVE
KIT LOT NO., HCLOLOT: NORMAL
NEGATIVE CONTROL: NORMAL
POSITIVE CONTROL: NORMAL
POTASSIUM SERPL-SCNC: 3.9 MMOL/L (ref 3.5–5.1)
SERVICE CMNT-IMP: ABNORMAL
SERVICE CMNT-IMP: NORMAL
SODIUM SERPL-SCNC: 142 MMOL/L (ref 136–145)

## 2018-09-14 PROCEDURE — C9113 INJ PANTOPRAZOLE SODIUM, VIA: HCPCS | Performed by: HOSPITALIST

## 2018-09-14 PROCEDURE — 74011250637 HC RX REV CODE- 250/637: Performed by: HOSPITALIST

## 2018-09-14 PROCEDURE — 74011000250 HC RX REV CODE- 250

## 2018-09-14 PROCEDURE — 74011000250 HC RX REV CODE- 250: Performed by: HOSPITALIST

## 2018-09-14 PROCEDURE — 82962 GLUCOSE BLOOD TEST: CPT

## 2018-09-14 PROCEDURE — 92526 ORAL FUNCTION THERAPY: CPT

## 2018-09-14 PROCEDURE — 74011250636 HC RX REV CODE- 250/636: Performed by: HOSPITALIST

## 2018-09-14 PROCEDURE — 76060000031 HC ANESTHESIA FIRST 0.5 HR: Performed by: INTERNAL MEDICINE

## 2018-09-14 PROCEDURE — 94760 N-INVAS EAR/PLS OXIMETRY 1: CPT

## 2018-09-14 PROCEDURE — 0DB68ZX EXCISION OF STOMACH, VIA NATURAL OR ARTIFICIAL OPENING ENDOSCOPIC, DIAGNOSTIC: ICD-10-PCS | Performed by: INTERNAL MEDICINE

## 2018-09-14 PROCEDURE — 88305 TISSUE EXAM BY PATHOLOGIST: CPT | Performed by: INTERNAL MEDICINE

## 2018-09-14 PROCEDURE — 65660000000 HC RM CCU STEPDOWN

## 2018-09-14 PROCEDURE — 0DJ08ZZ INSPECTION OF UPPER INTESTINAL TRACT, VIA NATURAL OR ARTIFICIAL OPENING ENDOSCOPIC: ICD-10-PCS | Performed by: INTERNAL MEDICINE

## 2018-09-14 PROCEDURE — 74011250636 HC RX REV CODE- 250/636

## 2018-09-14 PROCEDURE — 87077 CULTURE AEROBIC IDENTIFY: CPT | Performed by: INTERNAL MEDICINE

## 2018-09-14 PROCEDURE — 74011250636 HC RX REV CODE- 250/636: Performed by: SPECIALIST

## 2018-09-14 PROCEDURE — 36415 COLL VENOUS BLD VENIPUNCTURE: CPT | Performed by: HOSPITALIST

## 2018-09-14 PROCEDURE — 76040000019: Performed by: INTERNAL MEDICINE

## 2018-09-14 PROCEDURE — 74011250636 HC RX REV CODE- 250/636: Performed by: INTERNAL MEDICINE

## 2018-09-14 PROCEDURE — 77030033269 HC SLV COMPR SCD KNE2 CARD -B

## 2018-09-14 PROCEDURE — 77030019988 HC FCPS ENDOSC DISP BSC -B: Performed by: INTERNAL MEDICINE

## 2018-09-14 PROCEDURE — 80048 BASIC METABOLIC PNL TOTAL CA: CPT | Performed by: HOSPITALIST

## 2018-09-14 RX ORDER — SODIUM CHLORIDE 0.9 % (FLUSH) 0.9 %
5-10 SYRINGE (ML) INJECTION EVERY 8 HOURS
Status: COMPLETED | OUTPATIENT
Start: 2018-09-14 | End: 2018-09-14

## 2018-09-14 RX ORDER — FLUMAZENIL 0.1 MG/ML
0.2 INJECTION INTRAVENOUS
Status: DISCONTINUED | OUTPATIENT
Start: 2018-09-14 | End: 2018-09-14 | Stop reason: SDUPTHER

## 2018-09-14 RX ORDER — ATROPINE SULFATE 0.1 MG/ML
0.5 INJECTION INTRAVENOUS
Status: DISCONTINUED | OUTPATIENT
Start: 2018-09-14 | End: 2018-09-14 | Stop reason: HOSPADM

## 2018-09-14 RX ORDER — SODIUM CHLORIDE 9 MG/ML
75 INJECTION, SOLUTION INTRAVENOUS CONTINUOUS
Status: DISPENSED | OUTPATIENT
Start: 2018-09-14 | End: 2018-09-14

## 2018-09-14 RX ORDER — PROPOFOL 10 MG/ML
INJECTION, EMULSION INTRAVENOUS AS NEEDED
Status: DISCONTINUED | OUTPATIENT
Start: 2018-09-14 | End: 2018-09-14 | Stop reason: HOSPADM

## 2018-09-14 RX ORDER — MIDAZOLAM HYDROCHLORIDE 1 MG/ML
.25-5 INJECTION, SOLUTION INTRAMUSCULAR; INTRAVENOUS
Status: DISCONTINUED | OUTPATIENT
Start: 2018-09-14 | End: 2018-09-14 | Stop reason: HOSPADM

## 2018-09-14 RX ORDER — GLYCOPYRROLATE 0.2 MG/ML
INJECTION INTRAMUSCULAR; INTRAVENOUS AS NEEDED
Status: DISCONTINUED | OUTPATIENT
Start: 2018-09-14 | End: 2018-09-14 | Stop reason: HOSPADM

## 2018-09-14 RX ORDER — LIDOCAINE HYDROCHLORIDE 20 MG/ML
INJECTION, SOLUTION EPIDURAL; INFILTRATION; INTRACAUDAL; PERINEURAL AS NEEDED
Status: DISCONTINUED | OUTPATIENT
Start: 2018-09-14 | End: 2018-09-14 | Stop reason: HOSPADM

## 2018-09-14 RX ORDER — NALOXONE HYDROCHLORIDE 0.4 MG/ML
0.4 INJECTION, SOLUTION INTRAMUSCULAR; INTRAVENOUS; SUBCUTANEOUS
Status: DISCONTINUED | OUTPATIENT
Start: 2018-09-14 | End: 2018-09-14 | Stop reason: SDUPTHER

## 2018-09-14 RX ORDER — DEXTROMETHORPHAN/PSEUDOEPHED 2.5-7.5/.8
1.2 DROPS ORAL
Status: DISCONTINUED | OUTPATIENT
Start: 2018-09-14 | End: 2018-09-14 | Stop reason: SDUPTHER

## 2018-09-14 RX ORDER — ATROPINE SULFATE 0.1 MG/ML
0.5 INJECTION INTRAVENOUS
Status: DISCONTINUED | OUTPATIENT
Start: 2018-09-14 | End: 2018-09-14

## 2018-09-14 RX ORDER — EPINEPHRINE 0.1 MG/ML
1 INJECTION INTRACARDIAC; INTRAVENOUS
Status: DISCONTINUED | OUTPATIENT
Start: 2018-09-14 | End: 2018-09-14

## 2018-09-14 RX ORDER — SODIUM CHLORIDE 0.9 % (FLUSH) 0.9 %
5-10 SYRINGE (ML) INJECTION AS NEEDED
Status: ACTIVE | OUTPATIENT
Start: 2018-09-14 | End: 2018-09-14

## 2018-09-14 RX ORDER — FLUMAZENIL 0.1 MG/ML
0.2 INJECTION INTRAVENOUS
Status: DISCONTINUED | OUTPATIENT
Start: 2018-09-14 | End: 2018-09-14 | Stop reason: HOSPADM

## 2018-09-14 RX ORDER — SODIUM CHLORIDE 9 MG/ML
100 INJECTION, SOLUTION INTRAVENOUS CONTINUOUS
Status: DISPENSED | OUTPATIENT
Start: 2018-09-14 | End: 2018-09-14

## 2018-09-14 RX ORDER — NALOXONE HYDROCHLORIDE 0.4 MG/ML
0.4 INJECTION, SOLUTION INTRAMUSCULAR; INTRAVENOUS; SUBCUTANEOUS
Status: DISCONTINUED | OUTPATIENT
Start: 2018-09-14 | End: 2018-09-14 | Stop reason: HOSPADM

## 2018-09-14 RX ORDER — DEXTROMETHORPHAN/PSEUDOEPHED 2.5-7.5/.8
1.2 DROPS ORAL
Status: DISCONTINUED | OUTPATIENT
Start: 2018-09-14 | End: 2018-09-14 | Stop reason: HOSPADM

## 2018-09-14 RX ORDER — SODIUM CHLORIDE 0.9 % (FLUSH) 0.9 %
5-10 SYRINGE (ML) INJECTION EVERY 8 HOURS
Status: DISPENSED | OUTPATIENT
Start: 2018-09-14 | End: 2018-09-14

## 2018-09-14 RX ORDER — SODIUM CHLORIDE 0.9 % (FLUSH) 0.9 %
5-10 SYRINGE (ML) INJECTION EVERY 8 HOURS
Status: ACTIVE | OUTPATIENT
Start: 2018-09-14 | End: 2018-09-14

## 2018-09-14 RX ORDER — EPINEPHRINE 0.1 MG/ML
1 INJECTION INTRACARDIAC; INTRAVENOUS
Status: DISCONTINUED | OUTPATIENT
Start: 2018-09-14 | End: 2018-09-14 | Stop reason: HOSPADM

## 2018-09-14 RX ORDER — PANTOPRAZOLE SODIUM 40 MG/1
40 TABLET, DELAYED RELEASE ORAL 2 TIMES DAILY
Status: DISCONTINUED | OUTPATIENT
Start: 2018-09-14 | End: 2018-09-17 | Stop reason: HOSPADM

## 2018-09-14 RX ADMIN — Medication 10 ML: at 15:32

## 2018-09-14 RX ADMIN — SODIUM CHLORIDE 40 MG: 9 INJECTION, SOLUTION INTRAMUSCULAR; INTRAVENOUS; SUBCUTANEOUS at 10:44

## 2018-09-14 RX ADMIN — HALOPERIDOL LACTATE 2 MG: 5 INJECTION, SOLUTION INTRAMUSCULAR at 16:37

## 2018-09-14 RX ADMIN — HALOPERIDOL LACTATE 2 MG: 5 INJECTION, SOLUTION INTRAMUSCULAR at 00:07

## 2018-09-14 RX ADMIN — LIDOCAINE HYDROCHLORIDE 100 MG: 20 INJECTION, SOLUTION EPIDURAL; INFILTRATION; INTRACAUDAL; PERINEURAL at 08:32

## 2018-09-14 RX ADMIN — Medication 10 ML: at 22:56

## 2018-09-14 RX ADMIN — PANTOPRAZOLE SODIUM 40 MG: 40 TABLET, DELAYED RELEASE ORAL at 18:26

## 2018-09-14 RX ADMIN — SODIUM CHLORIDE 75 ML/HR: 900 INJECTION, SOLUTION INTRAVENOUS at 08:16

## 2018-09-14 RX ADMIN — HALOPERIDOL LACTATE 2 MG: 5 INJECTION, SOLUTION INTRAMUSCULAR at 23:05

## 2018-09-14 RX ADMIN — DEXTROSE MONOHYDRATE 125 ML/HR: 5 INJECTION, SOLUTION INTRAVENOUS at 01:53

## 2018-09-14 RX ADMIN — Medication 10 ML: at 10:45

## 2018-09-14 RX ADMIN — PROPOFOL 30 MG: 10 INJECTION, EMULSION INTRAVENOUS at 08:32

## 2018-09-14 RX ADMIN — PROPOFOL 20 MG: 10 INJECTION, EMULSION INTRAVENOUS at 08:34

## 2018-09-14 RX ADMIN — SODIUM CHLORIDE: 900 INJECTION, SOLUTION INTRAVENOUS at 08:29

## 2018-09-14 RX ADMIN — GLYCOPYRROLATE 0.2 MG: 0.2 INJECTION INTRAMUSCULAR; INTRAVENOUS at 08:32

## 2018-09-14 RX ADMIN — DEXTROSE MONOHYDRATE 75 ML/HR: 5 INJECTION, SOLUTION INTRAVENOUS at 18:35

## 2018-09-14 NOTE — PROGRESS NOTES
Bedside and Verbal shift change report given to [address] [place] [address] (oncoming nurse) by Matthew Tian RN (offgoing nurse). Report included the following information SBAR, Kardex, Intake/Output, MAR and Recent Results.

## 2018-09-14 NOTE — PROGRESS NOTES
Physical Therapy Attempting to see patient again this pm. Patient hypothermic and requiring chris hugger. Will defer therapy and follow back on Monday. Thank you, Milady Archibald, PT

## 2018-09-14 NOTE — PROGRESS NOTES
TRANSFER - IN REPORT: 
 
Verbal report received from Regina HWANG(name) on Lorna Crowe  being received from [facility](unit) for routine progression of care Report consisted of patients Situation, Background, Assessment and  
Recommendations(SBAR). Information from the following report(s) SBAR, Kardex, Procedure Summary, Intake/Output, MAR and Recent Results was reviewed with the receiving nurse. Opportunity for questions and clarification was provided. Assessment completed upon patients arrival to unit and care assumed.

## 2018-09-14 NOTE — PROGRESS NOTES
TRANSFER - OUT REPORT: 
 
Verbal report given to [facility]) on Malvin Barboza  being transferred to [facility](unit) for routine progression of care Report consisted of patients Situation, Background, Assessment and  
Recommendations(SBAR). Information from the following report(s) SBAR, Kardex, Procedure Summary and Recent Results was reviewed with the receiving nurse. Lines:  
Peripheral IV 09/14/18 Left Antecubital (Active) Site Assessment Clean, dry, & intact 9/14/2018  7:30 AM  
Phlebitis Assessment 0 9/14/2018  7:30 AM  
Infiltration Assessment 0 9/14/2018  7:30 AM  
Dressing Status Clean, dry, & intact 9/14/2018  7:30 AM  
Dressing Type Transparent 9/14/2018  7:30 AM  
Hub Color/Line Status Pink;Capped 9/14/2018  7:30 AM  
  
 
Opportunity for questions and clarification was provided. Reviewed IV site.

## 2018-09-14 NOTE — PROGRESS NOTES
F/U for anemia S: Ms. Karma Crowe was seen by me today during rounds. She is confused and getting an IV access. .  Patient cannot give me pfsh, ros, cc or hpi due to her mental status. The patient has no new complaints today. Please see admission consult for details of ROS; there are no other changes today. O: Blood pressure (!) 139/102, pulse 60, temperature 97.5 °F (36.4 °C), resp. rate 20, weight 51.2 kg (112 lb 14 oz), SpO2 100 %. Gen: Patient is in no acute distress. When I ask her what year it is she tells me to open the door. There is no jaundice. Lungs: Clear to auscultation bilaterally . Heart:+RRR. Abd: Soft, non tender, non-distended, bowel sounds present. Extremities: Warm. Cross sectional imagin. No evidence of genitourinary stone or hydronephrosis. 2. There are 2 right renal lesions, one hyperdense measuring 12 mm, the other 
hypodense measuring 18 mm. These are indeterminate without contrast material. 
Attempted further characterization with renal ultrasound can be performed on a 
nonemergent basis. 3. Moderate constipation. 4. Cardiomegaly. Lab Results Component Value Date/Time WBC 7.3 2018 12:34 AM  
 HGB 8.6 (L) 2018 12:34 AM  
 HCT 26.5 (L) 2018 12:34 AM  
 PLATELET 590 (L)  12:34 AM  
 MCV 88.6 2018 12:34 AM  
 
Lab Results Component Value Date/Time  Sodium 142 2018 01:15 AM  
 Potassium 3.9 2018 01:15 AM  
 Chloride 113 (H) 2018 01:15 AM  
 CO2 23 2018 01:15 AM  
 Anion gap 6 2018 01:15 AM  
 Glucose 139 (H) 2018 01:15 AM  
 BUN 18 2018 01:15 AM  
 Creatinine 1.40 (H) 2018 01:15 AM  
 BUN/Creatinine ratio 13 2018 01:15 AM  
 GFR est AA 43 (L) 2018 01:15 AM  
 GFR est non-AA 35 (L) 2018 01:15 AM  
 Calcium 8.5 2018 01:15 AM  
 Bilirubin, total 1.5 (H) 2018 12:08 AM  
 AST (SGOT) 57 (H) 2018 12:08 AM  
 Alk. phosphatase 105 09/12/2018 12:08 AM  
 Protein, total 5.8 (L) 09/12/2018 12:08 AM  
 Albumin 2.9 (L) 09/12/2018 12:08 AM  
 Globulin 2.9 09/12/2018 12:08 AM  
 A-G Ratio 1.0 (L) 09/12/2018 12:08 AM  
 ALT (SGPT) 45 09/12/2018 12:08 AM  
 
 
 
A: Active Problems: 
  Acute blood loss anemia (9/11/2018) Delirium (9/11/2018) Hypernatremia (9/11/2018) UTI (urinary tract infection) (9/11/2018) GI bleed (9/11/2018) Dementia (9/11/2018) [facility] (9/11/2018) P:   
· EGD today if ok with anesthesia. Consent signed. ·  Dr Susanna Sierra spoke with Leanna Mejia by phone and discussed objectives, risks, consequences and alternatives. · I could not get through to her and neither could the GI team. 
 
 
Radha Cates MD 
6:56 AM 
9/14/2018

## 2018-09-14 NOTE — PROGRESS NOTES
Bedside and Verbal shift change report given to dolores fisher (oncoming nurse) by Dani Wong (offgoing nurse). Report included the following information SBAR, Kardex, Intake/Output, MAR and Recent Results.

## 2018-09-14 NOTE — PROGRESS NOTES
Verbally informed Dr. Geoffrey Lewis of MEWS 3, due to hypothermia, requested chris hugger; verbal orders received for chris hugger.

## 2018-09-14 NOTE — PROGRESS NOTES
TRANSFER - IN REPORT: 
 
Verbal report received from [facility]) on Rhoderick Zee  being received from [facility](unit) for ordered procedure Report consisted of patients Situation, Background, Assessment and  
Recommendations(SBAR). Information from the following report(s) SBAR, Procedure Summary, Intake/Output and MAR was reviewed with the receiving nurse. Opportunity for questions and clarification was provided.

## 2018-09-14 NOTE — PROCEDURES
[place] Office: [phone]      Esophagogastroduodenoscopy Procedure Note      Jodi Penaloza  12/12/1924  [id number]    Indication:  GI bleed     : 2025 Kaye Shah MD    Referring Provider:  PROVIDER UNKNOWN    Sedation:  MAC anesthesia Propofol    Procedure Details:  After detailed informed consent was obtained for the procedure, with all risks and benefits of procedure explained the patient was taken to the endoscopy suite and placed in the left lateral decubitus position. Following sequential administration of sedation as per above, the endoscope was inserted into the mouth and advanced under direct vision to second portion of the duodenum. A careful inspection was made as the gastroscope was withdrawn, including a retroflexed view of the proximal stomach; findings and interventions are described below. Findings:     Esophagus: The esophageal mucosa in the proximal and mid  esophagus is normal.   LA grade A distal esophagitis is noted. There is a 3 cm sliding hiatal hernia. The squamo-columnar junction is at 36 cm where the Z-line was noted. Stomach: The gastric mucosa has multiple medium sized erosions in the antrum and some in the body. Biopsies taken. Gastritis (body) noted. KAIN testing perfomed   The fundus was found to be normal with no lesions noted on retroflexion. The angularis is normal.  There is no blood or active bleeding. Duodenum:   The bulb and post bulbar mucosa is erythematous with a few bulbar erosions seen. The duodenal folds are normal.     Therapies:  biopsy of stomach body, antrum, KAIN      Specimen: Specimens were collected as described and send to the laboratory. Complications:   None were encountered during the procedure. EBL:  None. Recommendations:     -Acid suppression with a proton pump inhibitor. ,   -Await pathology. ,   -Await KAIN test result and treat for Helicobacter pylori if positive. ,   -Follow hgb,   -No NSAIDS,   -If anemia continues consider colonoscopy(mental status, swallowing issues noted)          Damien Rodriguez MD  9/14/2018  8:38 AM

## 2018-09-14 NOTE — ROUTINE PROCESS
Verbal report received from A Bubba(drew) on [facility]   routine progression of care Report consisted of patients Situation, Background, Assessment and  
Recommendations(SBAR). Information from the following report(s) SBAR, Procedure Summary, Intake/Output, MAR and Accordion was reviewed with the receiving nurse. Opportunity for questions and clarification was provided. Assessment completed upon patients arrival to unit and care assumed.

## 2018-09-14 NOTE — PROGRESS NOTES
Hospitalist Progress Note Marly Mott MD. Cell: [phone] NAME:  Cr Woods :  1924 MRN:  [id number] Date of Service:  2018 Summary: 80 y.o. female who presented on 2018 with agitation and elevated BP. Assessment/Plan: 
Acute blood loss anemia in the setting of heme positive stool. Hb on admission of 5.8. Possibly due to upper GI bleed considering elevated BUN of 64 
- s/p 2 units of prbc transfusion. Hb now 8.6 
- S/p EGD  which showed gastric erosions, gastritis and duodenitis, esophagitis and hiatal hernia. - continue PPI. - continue serial H/H Hypernatremia Na 150 LAWANDA Cr 3.04, possibly due to dehydration along with ARB use 
--IVF with 1/2NS 
--No stone or hydronephrosis on CT abdomen 
- renal indices improving. Switch Dextrose saline to dextrose water due to worsening hypernatremia. - renal indices improving and serum sodium now within normal limits. - reduce IVf to 75 cc/hr 
  
Possible uti with hypothermia, pyuria 
--no bacteria on urine.   
- No growth on urine culture. S/p one dose of Rocephin. 
  
Acute metabolic encephalopathy in the setting of dementia Head CT scan unremarkable. Continue supportive care - Improving and looks better. She is still confused but more verbally responsive. - continue supportive care 
- fall precautions 
  
Scarring in right suprahilar on CTA chest.  Abnormal CXR with right suprahilar airspace disease 
--given rocephin and azithromycin in ER for possible PNA. However, CTA chest without lung mass or airspace disease. Abnormality on CXR due to atelectasis vs. Scar tissue. Not hypoxi or toxic looking. Antibiotics discontinued. 
  
HTN 
--reportedly /146 at assisted living. Has not taken meds yesterday or today. --BP normal here. [facility] --resume home norvasc. Monitor 
  
DM type 2 
--low dose SSI. Not on meds at home. hb1c < 3.5 
- continue accu checks. Monitor closely for hypoglycemia - Repeat another swallow evaluation and if passed can start on clear liquid diet and can gradually advance Hypothermia: can start chris hugger 
   
Code: d/w with mPOA, DNR/DNI 
DVT prophylaxis: SCD Surrogate decision maker:  POA daughter Raphael Roberts lives in [place] [phone].  [facility] step[facility] is not POA Code status: DNR 
DVT prophylaxsis: SCD Dispo: Likely discharge to SNF within the next 1-2 days Interval History/Subjective: 
F/u for GI bleed No acute overnight event. S/p EGD. History unobtainable due to dementia Review of Systems: 
Review of systems not obtained due to patient factors. Objective: VITALS:  
Last 24hrs VS reviewed since prior progress note. Most recent are: 
Visit Vitals  BP (!) 147/111  Pulse 77  Temp 94.9 °F (34.9 °C)  Resp 14  Wt 51.2 kg (112 lb 14 oz)  SpO2 99% Intake/Output Summary (Last 24 hours) at 09/14/18 1424 Last data filed at 09/14/18 2856 Gross per 24 hour Intake              200 ml Output                0 ml Net              200 ml PHYSICAL EXAM: 
General: Awake and alert. More cooperative today EENT: EOMI. Anicteric sclerae. Oral mucous moist, oropharynx benign Resp: CTA bilaterally. No wheezing/rhonchi/rales. No accessory muscle use CV: Regular rhythm, normal rate, no murmurs, gallops, rubs GI: Soft, non distended, non tender. normoactive bowel sounds, no hepatosplenomegaly Extremities: No edema, warm, 2+ pulses throughout Neurologic: Moves all extremities. Oriented to name, place or time. Psych: Good insight. Not anxious nor agitated. Skin: Good Turgor, no rashes or ulcers Lab Data Personally Reviewed: (see below) Medications list Personally Reviewed:  x YES  NO  
 
 _______________________________________________________________________ Care Plan discussed with:  Patient/Family and Nurse Total NON critical care TIME:  30 minutes Awilda Valdes MD  
 
Procedures: see electronic medical records for all procedures/Xrays and details which were not copied into this note but were reviewed prior to creation of Plan. LABS: 
Recent Labs  
   09/13/18 
 0034  09/12/18 
 0008 WBC  7.3  6.3 HGB  8.6*  9.2* HCT  26.5*  28.1*  
PLT  120*  120* Recent Labs  
   09/14/18 
 0115  09/13/18 
 1204  09/13/18 
 0034  09/12/18 
 0008 NA  142  148*  152*  151*  
K  3.9  4.1  4.4  4.3 CL  113*  119*  122*  122* CO2  23  24  23  21 BUN  18  23*  28*  48* CREA  1.40*  1.52*  1.88*  2.51* GLU  139*  101*  119*  112* CA  8.5  8.4*  7.8*  7.8*  
MG   --    --    --   2.8* PHOS   --    --    --   3.8 Recent Labs  
   09/12/18 0008 SGOT  57* ALT  45 AP  105 TBILI  1.5* TP  5.8* ALB  2.9*  
GLOB  2.9 No results for input(s): INR, PTP, APTT in the last 72 hours. No lab exists for component: INREXT, INREXT Recent Labs  
   09/11/18 
 1534 TIBC  308 PSAT  10* FERR  22 No results found for: FOL, RBCF No results for input(s): PH, PCO2, PO2 in the last 72 hours. Recent Labs  
   09/11/18 
 1534 TROIQ  <0.05 No results found for: CHOL, CHOLX, CHLST, CHOLV, HDL, LDL, LDLC, DLDLP, TGLX, TRIGL, TRIGP, CHHD, CHHDX Lab Results Component Value Date/Time Glucose (POC) 116 (H) 09/14/2018 11:53 AM  
 Glucose (POC) 94 09/14/2018 07:34 AM  
 Glucose (POC) 154 (H) 09/14/2018 02:00 AM  
 Glucose (POC) 120 (H) 09/13/2018 05:55 PM  
 Glucose (POC) 109 (H) 09/13/2018 01:20 PM  
 
Lab Results Component Value Date/Time  Color YELLOW/STRAW 09/11/2018 09:34 AM  
 Appearance CLOUDY (A) 09/11/2018 09:34 AM  
 Specific gravity 1.014 09/11/2018 09:34 AM  
 pH (UA) 5.0 09/11/2018 09:34 AM  
 Protein NEGATIVE  09/11/2018 09:34 AM  
 Glucose NEGATIVE  09/11/2018 09:34 AM  
 Ketone NEGATIVE  09/11/2018 09:34 AM  
 Bilirubin NEGATIVE  09/11/2018 09:34 AM  
 Urobilinogen 0.2 09/11/2018 09:34 AM  
 Nitrites NEGATIVE  09/11/2018 09:34 AM  
 Leukocyte Esterase LARGE (A) 09/11/2018 09:34 AM  
 Epithelial cells FEW 09/11/2018 09:34 AM  
 Bacteria NEGATIVE  09/11/2018 09:34 AM  
 WBC >100 (H) 09/11/2018 09:34 AM  
 RBC 0-5 09/11/2018 09:34 AM

## 2018-09-14 NOTE — PROGRESS NOTES
Zervant Telesitter Monitor initiated on 9/14/2018 at [phone] for the following reason(s): Patient pulling out peripheral IV, attempting to get out of bed, high fall risk . If patient unable to verbalize understanding of camera necessity, the responsible party notified and educated: Homer Zeng, patient daughter at [phone].

## 2018-09-14 NOTE — PROGRESS NOTES
Problem: Dysphagia (Adult) Goal: *Acute Goals and Plan of Care (Insert Text) Speech pathology goals initiated 9/13/2018 1. Patient will participate in swallow re-assessment within 7 days. Goal met 9/14. 2. Pt will tolerate dys 3/chopped diet with no overt s/s of aspiration. Speech language pathology dysphagia treatment Patient: Jodi Penaloza (65 y.o. female) Date: 9/14/2018 Diagnosis: Delirium UTI (urinary tract infection) LAWANDA (acute kidney injury) ([facility] Hypernatremia Acute blood loss anemia GI bleed Dementia 
anemia, ?blood loss  
gi bleed <principal problem not specified> Procedure(s) (LRB): ESOPHAGOGASTRODUODENOSCOPY (EGD) (N/A) ESOPHAGOGASTRODUODENAL (EGD) BIOPSY (N/A) Day of Surgery Precautions:    
 
ASSESSMENT: 
Pt seen today for swallowing therapy. She was eager to eat and drink. She ingested water, purees and solids. Her upper denture was loose and denture adhesive was obtained. She masticated solids slowly. Pharyngeal phase was characterized by mild swallow delay. No coughing or choking noted. Her vocal quality was dry. Speech was fully intelligible and basic language functional.  
Progression toward goals: 
[]         Improving appropriately and progressing toward goals [x]         Improving slowly and progressing toward goals 
[]         Not making progress toward goals and plan of care will be adjusted PLAN: 
Recommendations and Planned Interventions: 
dys 3 diet with thins Patient continues to benefit from skilled intervention to address the above impairments. Continue treatment per established plan of care. Discharge Recommendations:  None SUBJECTIVE:  
Patient stated she was hungry. . 
 
OBJECTIVE:  
Cognitive and Communication Status: 
Neurologic State: Alert Orientation Level: Oriented to person Cognition: Follows commands Perception: Appears intact Perseveration: No perseveration noted Dysphagia Treatment: 
Oral Assessment: 
Oral Assessment Dentition: Edentulous; Upper & lower dentures P.O. Trials: 
  
Vocal quality prior to P.O.: No impairment Consistency Presented: Thin liquid;Puree;Mechanical soft How Presented: Self-fed/presented;Cup/sip; Successive swallows Bolus Acceptance: No impairment Bolus Formation/Control: No impairment Propulsion: No impairment Oral Residue: None Initiation of Swallow: No impairment Laryngeal Elevation: Functional 
Aspiration Signs/Symptoms: None Pharyngeal Phase Characteristics: No impairment, issues, or problems Effective Modifications: None Oral Phase Severity: No impairment Pharyngeal Phase Severity : No impairment Pain: 
Pain Scale 1: Visual 
Pain Intensity 1: 0 After treatment:  
[]              Patient left in no apparent distress sitting up in chair 
[x]              Patient left in no apparent distress in bed 
[x]              Call bell left within reach [x]              Nursing notified 
[]              Caregiver present 
[]              Bed alarm activated COMMUNICATION/EDUCATION:  
Educated pt's family that she will start on a [facility] soft diet. The patients plan of care including recommendations, planned interventions, and recommended diet changes were discussed with: Registered Nurse. []              Posted safety precautions in patient's room. LUIS Wood Time Calculation: 20 mins

## 2018-09-14 NOTE — ANESTHESIA POSTPROCEDURE EVALUATION
Post-Anesthesia Evaluation and Assessment Patient: Dyllan Peoples MRN: [id number]  SSN: [id number] YOB: 1924  Age: 80 y.o. Sex: female Cardiovascular Function/Vital Signs Visit Vitals  /61  Pulse 92  Temp 36.4 °C (97.5 °F)  Resp (!) 61  Wt 51.2 kg (112 lb 14 oz)  SpO2 100% Patient is status post total IV anesthesia anesthesia for Procedure(s): ESOPHAGOGASTRODUODENOSCOPY (EGD) ESOPHAGOGASTRODUODENAL (EGD) BIOPSY. Nausea/Vomiting: None Postoperative hydration reviewed and adequate. Pain: 
Pain Scale 1: Visual (09/14/18 0735) Pain Intensity 1: 0 (09/14/18 0735) Managed Neurological Status:  
Neuro Neurologic State: Confused (09/13/18 2011) Orientation Level: Oriented to person (09/13/18 2011) Cognition: Decreased attention/concentration;Decreased command following;Poor safety awareness (09/13/18 2011) Speech: Slurred (09/13/18 2011) Size L Pupil (mm): 4 (09/11/18 0946) Size R Pupil (mm): 4 (09/11/18 0946) LUE Motor Response: Weak (09/13/18 0755) LLE Motor Response: Weak (09/13/18 0755) RUE Motor Response: Weak (09/13/18 0755) RLE Motor Response: Weak (09/13/18 0755) At baseline Mental Status and Level of Consciousness: Arousable Pulmonary Status:  
O2 Device: CO2 nasal cannula (09/14/18 2192) Adequate oxygenation and airway patent Complications related to anesthesia: None Post-anesthesia assessment completed. No concerns Signed By: Payton Carreno MD   
 September 14, 2018

## 2018-09-15 LAB
ANION GAP SERPL CALC-SCNC: 9 MMOL/L (ref 5–15)
BUN SERPL-MCNC: 15 MG/DL (ref 6–20)
BUN/CREAT SERPL: 10 (ref 12–20)
CALCIUM SERPL-MCNC: 8.2 MG/DL (ref 8.5–10.1)
CHLORIDE SERPL-SCNC: 108 MMOL/L (ref 97–108)
CO2 SERPL-SCNC: 23 MMOL/L (ref 21–32)
CREAT SERPL-MCNC: 1.46 MG/DL (ref 0.55–1.02)
GLUCOSE BLD STRIP.AUTO-MCNC: 121 MG/DL (ref 65–100)
GLUCOSE BLD STRIP.AUTO-MCNC: 133 MG/DL (ref 65–100)
GLUCOSE BLD STRIP.AUTO-MCNC: 86 MG/DL (ref 65–100)
GLUCOSE SERPL-MCNC: 92 MG/DL (ref 65–100)
HGB BLD-MCNC: 8.5 G/DL (ref 11.5–16)
POTASSIUM SERPL-SCNC: 4.2 MMOL/L (ref 3.5–5.1)
SERVICE CMNT-IMP: ABNORMAL
SERVICE CMNT-IMP: ABNORMAL
SERVICE CMNT-IMP: NORMAL
SODIUM SERPL-SCNC: 140 MMOL/L (ref 136–145)

## 2018-09-15 PROCEDURE — 74011250636 HC RX REV CODE- 250/636: Performed by: HOSPITALIST

## 2018-09-15 PROCEDURE — 65660000000 HC RM CCU STEPDOWN

## 2018-09-15 PROCEDURE — 94760 N-INVAS EAR/PLS OXIMETRY 1: CPT

## 2018-09-15 PROCEDURE — 74011250637 HC RX REV CODE- 250/637: Performed by: HOSPITALIST

## 2018-09-15 PROCEDURE — 36415 COLL VENOUS BLD VENIPUNCTURE: CPT | Performed by: HOSPITALIST

## 2018-09-15 PROCEDURE — L3710 EO ELAS W/METAL JNTS PRE OTS: HCPCS

## 2018-09-15 PROCEDURE — 85018 HEMOGLOBIN: CPT | Performed by: HOSPITALIST

## 2018-09-15 PROCEDURE — 74011250637 HC RX REV CODE- 250/637: Performed by: INTERNAL MEDICINE

## 2018-09-15 PROCEDURE — 74011000250 HC RX REV CODE- 250: Performed by: HOSPITALIST

## 2018-09-15 PROCEDURE — 80048 BASIC METABOLIC PNL TOTAL CA: CPT | Performed by: HOSPITALIST

## 2018-09-15 PROCEDURE — 82962 GLUCOSE BLOOD TEST: CPT

## 2018-09-15 RX ORDER — HYDRALAZINE HYDROCHLORIDE 25 MG/1
25 TABLET, FILM COATED ORAL
Status: DISCONTINUED | OUTPATIENT
Start: 2018-09-15 | End: 2018-09-17 | Stop reason: HOSPADM

## 2018-09-15 RX ORDER — HALOPERIDOL 5 MG/ML
0.5 INJECTION INTRAMUSCULAR
Status: DISCONTINUED | OUTPATIENT
Start: 2018-09-15 | End: 2018-09-17 | Stop reason: HOSPADM

## 2018-09-15 RX ADMIN — HYDRALAZINE HYDROCHLORIDE 25 MG: 25 TABLET, FILM COATED ORAL at 08:58

## 2018-09-15 RX ADMIN — Medication 10 ML: at 06:13

## 2018-09-15 RX ADMIN — Medication 10 ML: at 23:14

## 2018-09-15 RX ADMIN — PANTOPRAZOLE SODIUM 40 MG: 40 TABLET, DELAYED RELEASE ORAL at 08:57

## 2018-09-15 RX ADMIN — POLYETHYLENE GLYCOL 3350 17 G: 17 POWDER, FOR SOLUTION ORAL at 08:57

## 2018-09-15 RX ADMIN — DEXTROSE MONOHYDRATE 75 ML/HR: 5 INJECTION, SOLUTION INTRAVENOUS at 10:39

## 2018-09-15 RX ADMIN — HALOPERIDOL LACTATE 2 MG: 5 INJECTION, SOLUTION INTRAMUSCULAR at 06:12

## 2018-09-15 RX ADMIN — PANTOPRAZOLE SODIUM 40 MG: 40 TABLET, DELAYED RELEASE ORAL at 17:30

## 2018-09-15 RX ADMIN — DEXTROSE MONOHYDRATE 75 ML/HR: 5 INJECTION, SOLUTION INTRAVENOUS at 22:47

## 2018-09-15 NOTE — PROGRESS NOTES
Hospitalist Progress Note David Moise MD     
 
 
  
 
 
 
 
NAME:  Frank Torres :  1924 MRN:  [id number] Date of Service:  9/15/2018 Summary: 80 y.o. female who presented on 2018 with agitation and elevated BP. Assessment/Plan: 
Acute blood loss anemia in the setting of heme positive stool. Hgb on admission of 5.8. Possibly due to upper GI bleed considering elevated BUN of 64 
- s/p 2 units of prbc transfusion. Hb now 8.6 
- S/p EGD  which showed gastric erosions, gastritis and duodenitis, esophagitis and hiatal hernia. - continue PPI. - continue serial H/H Hypernatremia Na 150, resolved thought to be due to dehydration LAWANDA Cr 3.04, possibly due to dehydration along with ARB use, improved with IVF 
-No stone or hydronephrosis on CT abdomen 
-trend 
  
Possible uti with hypothermia, pyuria 
--no bacteria on urine.   
-No growth on urine culture. S/p one dose of Rocephin. 
-no fever 
  
Acute metabolic encephalopathy in the setting of dementia and hypernatremia and possible UTI 
-Head CT scan no acute process 
-per weekday hospitalist, mental status improved. -afebrile 
  
Scarring in right suprahilar on CTA chest.  Abnormal CXR with right suprahilar airspace disease 
--given rocephin and azithromycin in ER for possible PNA. However, CTA chest without lung mass or airspace disease. Abnormality on CXR due to atelectasis vs. Scar tissue. Not hypoxi or toxic looking. Antibiotics were discontinued. 
  
Hypertension history 
-home losartan held due to [facility] on admission 
-adjust meds as needed 
  
DM type 2 history no requiring meds prior to admission 
-on ssi and monitor Hypothermia, resolved Other CT findings: will need outpatient followup. 
   
Code: d/w with mPOA, DNR/DNI as per weekday hospitalist 
DVT prophylaxis: SCD Surrogate decision maker:  POA daughter Koffi Sanders lives in [place] [phone].  Elliot Yap [facility] stepfather) is not POA  
DVT prophylaxsis: SCD Dispo: Likely discharge to SNF Interval History/Subjective: 
F/u for GI bleed No acute overnight event. Review of Systems: 
Review of systems not obtained due to patient factors. dementia Objective: VITALS:  
Last 24hrs VS reviewed since prior progress note. Most recent are: 
Visit Vitals  /56 (BP 1 Location: Left leg, BP Patient Position: At rest)  Pulse 62  Temp 97.6 °F (36.4 °C)  Resp 16  Wt 51.2 kg (112 lb 14 oz)  SpO2 96% Intake/Output Summary (Last 24 hours) at 09/15/18 0745 Last data filed at 09/14/18 1900 Gross per 24 hour Intake             1280 ml Output                0 ml Net             1280 ml PHYSICAL EXAM: 
General: Awake EENT:  Anicteric sclerae. Oral mucous moist, oropharynx benign Resp: CTA bilaterally. No wheezing/rhonchi/rales. No accessory muscle use CV: Regular rhythm, normal rate, no  gallops, rubs GI: Soft, non distended, non tender. normoactive bowel sounds Extremities: No edema, warm, 2+ pulses throughout Neurologic: Moves all extremities. Psych:  Not anxious nor agitated. Skin: no cyanosis 
 
 
_______________________________________________________________________ Care Plan discussed with: patient and nurse Total NON critical care TIME:  30 minutes Zulema Jones MD  
 
Procedures: see electronic medical records for all procedures/Xrays and details which were not copied into this note but were reviewed prior to creation of Plan. LABS: 
Recent Labs  
   09/15/18 
 0312  09/13/18 
 0034 WBC   --   7.3 HGB  8.5*  8.6* HCT   --   26.5*  
PLT   --   120* Recent Labs  
   09/15/18 
 0312  09/14/18 
 0115  09/13/18 
 1204 NA  140  142  148* K  4.2  3.9  4.1 CL  108  113*  119* CO2  23  23  24 BUN  15  18  23* CREA  1.46*  1.40*  1.52* GLU  92  139*  101* CA  8.2*  8.5  8.4*  
 
 
 Lab Results Component Value Date/Time  Glucose (POC) 86 09/15/2018 05:58 AM  
 Glucose (POC) 113 (H) 09/14/2018 11:18 PM  
 Glucose (POC) 130 (H) 09/14/2018 04:45 PM  
 Glucose (POC) 116 (H) 09/14/2018 11:53 AM  
 Glucose (POC) 94 09/14/2018 07:34 AM

## 2018-09-15 NOTE — PROGRESS NOTES
2305: Pt  given haldol 2mg IV  for agitation Bedside and Verbal shift change report given to Igor Maya (oncoming nurse) by Jasen Khan RN (offgoing nurse). Report included the following information SBAR, Kardex, Intake/Output, MAR, Accordion, Recent Results and Med Rec Status.

## 2018-09-15 NOTE — PROGRESS NOTES
Bedside and Verbal shift change report given to [address] [place] [address] (oncoming nurse) by Kina Brooke RN (offgoing nurse). Report included the following information SBAR, Kardex, Intake/Output, MAR and Recent Results.

## 2018-09-16 LAB
ALBUMIN SERPL-MCNC: 2.3 G/DL (ref 3.5–5)
ANION GAP SERPL CALC-SCNC: 9 MMOL/L (ref 5–15)
BACTERIA SPEC CULT: NORMAL
BASOPHILS # BLD: 0 K/UL (ref 0–0.1)
BASOPHILS NFR BLD: 0 % (ref 0–1)
BUN SERPL-MCNC: 20 MG/DL (ref 6–20)
BUN/CREAT SERPL: 12 (ref 12–20)
CALCIUM SERPL-MCNC: 8 MG/DL (ref 8.5–10.1)
CHLORIDE SERPL-SCNC: 105 MMOL/L (ref 97–108)
CK SERPL-CCNC: 306 U/L (ref 26–192)
CO2 SERPL-SCNC: 23 MMOL/L (ref 21–32)
CREAT SERPL-MCNC: 1.7 MG/DL (ref 0.55–1.02)
DIFFERENTIAL METHOD BLD: ABNORMAL
EOSINOPHIL # BLD: 0.3 K/UL (ref 0–0.4)
EOSINOPHIL NFR BLD: 3 % (ref 0–7)
ERYTHROCYTE [DISTWIDTH] IN BLOOD BY AUTOMATED COUNT: 13.7 % (ref 11.5–14.5)
GLUCOSE BLD STRIP.AUTO-MCNC: 168 MG/DL (ref 65–100)
GLUCOSE BLD STRIP.AUTO-MCNC: 87 MG/DL (ref 65–100)
GLUCOSE BLD STRIP.AUTO-MCNC: 93 MG/DL (ref 65–100)
GLUCOSE BLD STRIP.AUTO-MCNC: 94 MG/DL (ref 65–100)
GLUCOSE SERPL-MCNC: 84 MG/DL (ref 65–100)
HCT VFR BLD AUTO: 24.7 % (ref 35–47)
HGB BLD-MCNC: 8.5 G/DL (ref 11.5–16)
IMM GRANULOCYTES # BLD: 0 K/UL (ref 0–0.04)
IMM GRANULOCYTES NFR BLD AUTO: 0 % (ref 0–0.5)
LYMPHOCYTES # BLD: 1.2 K/UL (ref 0.8–3.5)
LYMPHOCYTES NFR BLD: 16 % (ref 12–49)
MCH RBC QN AUTO: 29.5 PG (ref 26–34)
MCHC RBC AUTO-ENTMCNC: 34.4 G/DL (ref 30–36.5)
MCV RBC AUTO: 85.8 FL (ref 80–99)
MONOCYTES # BLD: 1 K/UL (ref 0–1)
MONOCYTES NFR BLD: 13 % (ref 5–13)
NEUTS SEG # BLD: 5.1 K/UL (ref 1.8–8)
NEUTS SEG NFR BLD: 67 % (ref 32–75)
NRBC # BLD: 0 K/UL (ref 0–0.01)
NRBC BLD-RTO: 0 PER 100 WBC
PHOSPHATE SERPL-MCNC: 4.1 MG/DL (ref 2.6–4.7)
PLATELET # BLD AUTO: 112 K/UL (ref 150–400)
PMV BLD AUTO: 11.7 FL (ref 8.9–12.9)
POTASSIUM SERPL-SCNC: 3.8 MMOL/L (ref 3.5–5.1)
RBC # BLD AUTO: 2.88 M/UL (ref 3.8–5.2)
SERVICE CMNT-IMP: ABNORMAL
SERVICE CMNT-IMP: NORMAL
SODIUM SERPL-SCNC: 137 MMOL/L (ref 136–145)
WBC # BLD AUTO: 7.6 K/UL (ref 3.6–11)

## 2018-09-16 PROCEDURE — 74011250637 HC RX REV CODE- 250/637: Performed by: HOSPITALIST

## 2018-09-16 PROCEDURE — 36415 COLL VENOUS BLD VENIPUNCTURE: CPT | Performed by: INTERNAL MEDICINE

## 2018-09-16 PROCEDURE — 82550 ASSAY OF CK (CPK): CPT | Performed by: INTERNAL MEDICINE

## 2018-09-16 PROCEDURE — 74011250636 HC RX REV CODE- 250/636: Performed by: HOSPITALIST

## 2018-09-16 PROCEDURE — 85025 COMPLETE CBC W/AUTO DIFF WBC: CPT | Performed by: INTERNAL MEDICINE

## 2018-09-16 PROCEDURE — 65660000000 HC RM CCU STEPDOWN

## 2018-09-16 PROCEDURE — 82962 GLUCOSE BLOOD TEST: CPT

## 2018-09-16 PROCEDURE — 80069 RENAL FUNCTION PANEL: CPT | Performed by: INTERNAL MEDICINE

## 2018-09-16 PROCEDURE — 94760 N-INVAS EAR/PLS OXIMETRY 1: CPT

## 2018-09-16 PROCEDURE — 74011000250 HC RX REV CODE- 250: Performed by: HOSPITALIST

## 2018-09-16 RX ADMIN — Medication 10 ML: at 21:25

## 2018-09-16 RX ADMIN — POLYETHYLENE GLYCOL 3350 17 G: 17 POWDER, FOR SOLUTION ORAL at 08:00

## 2018-09-16 RX ADMIN — Medication 10 ML: at 14:31

## 2018-09-16 RX ADMIN — PANTOPRAZOLE SODIUM 40 MG: 40 TABLET, DELAYED RELEASE ORAL at 08:00

## 2018-09-16 RX ADMIN — DEXTROSE MONOHYDRATE 75 ML/HR: 5 INJECTION, SOLUTION INTRAVENOUS at 13:37

## 2018-09-16 RX ADMIN — PANTOPRAZOLE SODIUM 40 MG: 40 TABLET, DELAYED RELEASE ORAL at 17:30

## 2018-09-16 RX ADMIN — Medication 10 ML: at 05:25

## 2018-09-16 NOTE — PROGRESS NOTES
Bedside and Verbal shift change report given to Nadege RN (oncoming nurse) by Rosalino Hairston RN (offgoing nurse). Report included the following information SBAR, Kardex, Intake/Output, MAR, Accordion, Recent Results and Med Rec Status.

## 2018-09-16 NOTE — PROGRESS NOTES
Bedside shift change report given to JAIDEN Whitehead (oncoming nurse) by Irene Hobson (offgoing nurse). Report included the following information SBAR, Kardex, Intake/Output, MAR and Recent Results.

## 2018-09-16 NOTE — PROGRESS NOTES
Hospitalist Progress Note Wilfredo Jones MD     
 
 
  
 
 
 
 
NAME:  Kai Avila :  1924 MRN:  [id number] Date of Service:  2018 Summary: 80 y.o. female who presented on 2018 with agitation and elevated BP. Assessment/Plan: 
Acute blood loss anemia in the setting of heme positive stool. Hgb on admission of 5.8. Possibly due to upper GI bleed considering elevated BUN of 64 
- s/p 2 units of prbc transfusion.   
- hgb currently stable - S/p EGD  which showed gastric erosions, gastritis and duodenitis, esophagitis and hiatal hernia. - continue PPI. - continue to trend Hypernatremia Na 150, resolved thought to be due to dehydration LAWANDA Cr 3.04, possibly due to dehydration along with ARB use, improved with IVF 
-No stone or hydronephrosis on CT abdomen 
-trend 
  
Possible uti with hypothermia, pyuria 
--no bacteria on urine.   
-No growth on urine culture. S/p one dose of Rocephin. 
-no fever 
  
Acute metabolic encephalopathy in the setting of dementia and hypernatremia and possible UTI 
-Head CT scan no acute process 
-per weekday hospitalist, mental status improved. -afebrile 
  
Scarring in right suprahilar on CTA chest.  Abnormal CXR with right suprahilar airspace disease 
--given rocephin and azithromycin in ER for possible PNA. However, CTA chest without lung mass or airspace disease. Abnormality on CXR due to atelectasis vs. Scar tissue. Not hypoxi or toxic looking. Antibiotics were discontinued. 
  
Hypertension history 
-home losartan held due to [facility] on admission 
-adjust meds as needed 
  
DM type 2 history no requiring meds prior to admission 
-on ssi and monitor Hypothermia, resolved Other CT findings: will need outpatient followup. 
   
Code: d/w with mPOA, DNR/DNI as per weekday hospitalist 
DVT prophylaxis: SCD Surrogate decision maker:  POA daughter Regla Peoples lives in [place] [phone].  [facility]) is not POA  
DVT prophylaxsis: SCD Dispo: Likely discharge to SNF (followup with  on Monday) Interval History/Subjective: H/h stable No acute overnight event. Review of Systems: 
Review of systems not obtained due to patient factors. dementia Objective: VITALS:  
Last 24hrs VS reviewed since prior progress note. Most recent are: 
Visit Vitals  /73 (BP 1 Location: Left arm, BP Patient Position: At rest)  Pulse 67  Temp 96.9 °F (36.1 °C)  Resp 18  Wt 51.2 kg (112 lb 14 oz)  SpO2 99% Intake/Output Summary (Last 24 hours) at 09/16/18 1115 Last data filed at 09/16/18 0300 Gross per 24 hour Intake          1891.25 ml Output                1 ml Net          1890.25 ml PHYSICAL EXAM: 
General: Awake EENT:  Anicteric sclerae. Oral mucous moist, oropharynx benign Resp: CTA bilaterally. No wheezing/rhonchi/rales. No accessory muscle use CV: Regular rhythm, normal rate, no  gallops, rubs GI: Soft, non distended, non tender. normoactive bowel sounds Extremities: No edema, warm, 2+ pulses throughout Neurologic: Moves all extremities. Psych:  Not anxious nor agitated. Skin: no cyanosis 
 
 
_______________________________________________________________________ Care Plan discussed with: patient and nurse Total NON critical care TIME:  25 minutes Rell Gant MD  
 
Procedures: see electronic medical records for all procedures/Xrays and details which were not copied into this note but were reviewed prior to creation of Plan. LABS: 
Recent Labs  
   09/16/18 
 0248  09/15/18 
 6225 WBC  7.6   --   
HGB  8.5*  8.5* HCT  24.7*   --   
PLT  112*   --   
 
Recent Labs  
   09/16/18 
 0248  09/15/18 
 0312  09/14/18 
 0115 NA  137  140  142  
K  3.8  4.2  3.9 CL  105  108  113* CO2  23  23  23 BUN  20  15  18 CREA  1.70*  1.46*  1.40* GLU  84  92  139* CA  8.0*  8.2*  8.5 PHOS  4.1   --    --   
 
 
Lab Results Component Value Date/Time  Glucose (POC) 87 09/16/2018 05:40 AM  
 Glucose (POC) 93 09/16/2018 12:04 AM  
 Glucose (POC) 133 (H) 09/15/2018 06:01 PM  
 Glucose (POC) 121 (H) 09/15/2018 11:22 AM  
 Glucose (POC) 86 09/15/2018 05:58 AM

## 2018-09-16 NOTE — PROGRESS NOTES
Problem: Falls - Risk of 
Goal: *Absence of Falls Document Cole Matute Fall Risk and appropriate interventions in the flowsheet. Outcome: Progressing Towards Goal 
Fall Risk Interventions: 
Mobility Interventions: Bed/chair exit alarm, Communicate number of staff needed for ambulation/transfer, Strengthening exercises (ROM-active/passive) Mentation Interventions: Adequate sleep, hydration, pain control, Bed/chair exit alarm, Door open when patient unattended, Family/sitter at bedside, Room close to nurse's station, Toileting rounds, More frequent rounding Medication Interventions: Bed/chair exit alarm Elimination Interventions: Call light in reach, Bed/chair exit alarm, Toileting schedule/hourly rounds

## 2018-09-17 VITALS
OXYGEN SATURATION: 99 % | WEIGHT: 112.88 LBS | HEART RATE: 63 BPM | SYSTOLIC BLOOD PRESSURE: 132 MMHG | DIASTOLIC BLOOD PRESSURE: 74 MMHG | RESPIRATION RATE: 19 BRPM | TEMPERATURE: 98.2 F

## 2018-09-17 PROBLEM — K92.2 GI BLEED: Status: RESOLVED | Noted: 2018-09-11 | Resolved: 2018-09-17

## 2018-09-17 PROBLEM — E87.0 HYPERNATREMIA: Status: RESOLVED | Noted: 2018-09-11 | Resolved: 2018-09-17

## 2018-09-17 PROBLEM — D62 ACUTE BLOOD LOSS ANEMIA: Status: RESOLVED | Noted: 2018-09-11 | Resolved: 2018-09-17

## 2018-09-17 PROBLEM — N17.9 AKI (ACUTE KIDNEY INJURY) (HCC): Status: RESOLVED | Noted: 2018-09-11 | Resolved: 2018-09-17

## 2018-09-17 PROBLEM — N39.0 UTI (URINARY TRACT INFECTION): Status: RESOLVED | Noted: 2018-09-11 | Resolved: 2018-09-17

## 2018-09-17 PROBLEM — R41.0 DELIRIUM: Status: RESOLVED | Noted: 2018-09-11 | Resolved: 2018-09-17

## 2018-09-17 LAB
ANION GAP SERPL CALC-SCNC: 6 MMOL/L (ref 5–15)
BASOPHILS # BLD: 0 K/UL (ref 0–0.1)
BASOPHILS NFR BLD: 0 % (ref 0–1)
BUN SERPL-MCNC: 16 MG/DL (ref 6–20)
BUN/CREAT SERPL: 11 (ref 12–20)
CALCIUM SERPL-MCNC: 8.5 MG/DL (ref 8.5–10.1)
CHLORIDE SERPL-SCNC: 107 MMOL/L (ref 97–108)
CO2 SERPL-SCNC: 27 MMOL/L (ref 21–32)
CREAT SERPL-MCNC: 1.52 MG/DL (ref 0.55–1.02)
DIFFERENTIAL METHOD BLD: ABNORMAL
EOSINOPHIL # BLD: 0.3 K/UL (ref 0–0.4)
EOSINOPHIL NFR BLD: 3 % (ref 0–7)
ERYTHROCYTE [DISTWIDTH] IN BLOOD BY AUTOMATED COUNT: 14 % (ref 11.5–14.5)
GLUCOSE BLD STRIP.AUTO-MCNC: 85 MG/DL (ref 65–100)
GLUCOSE BLD STRIP.AUTO-MCNC: 90 MG/DL (ref 65–100)
GLUCOSE BLD STRIP.AUTO-MCNC: 91 MG/DL (ref 65–100)
GLUCOSE SERPL-MCNC: 88 MG/DL (ref 65–100)
HCT VFR BLD AUTO: 25.4 % (ref 35–47)
HGB BLD-MCNC: 8.4 G/DL (ref 11.5–16)
IMM GRANULOCYTES # BLD: 0 K/UL (ref 0–0.04)
IMM GRANULOCYTES NFR BLD AUTO: 1 % (ref 0–0.5)
LYMPHOCYTES # BLD: 1.7 K/UL (ref 0.8–3.5)
LYMPHOCYTES NFR BLD: 22 % (ref 12–49)
MCH RBC QN AUTO: 28.9 PG (ref 26–34)
MCHC RBC AUTO-ENTMCNC: 33.1 G/DL (ref 30–36.5)
MCV RBC AUTO: 87.3 FL (ref 80–99)
MONOCYTES # BLD: 0.9 K/UL (ref 0–1)
MONOCYTES NFR BLD: 13 % (ref 5–13)
NEUTS SEG # BLD: 4.5 K/UL (ref 1.8–8)
NEUTS SEG NFR BLD: 61 % (ref 32–75)
NRBC # BLD: 0 K/UL (ref 0–0.01)
NRBC BLD-RTO: 0 PER 100 WBC
PLATELET # BLD AUTO: 130 K/UL (ref 150–400)
PMV BLD AUTO: 11.5 FL (ref 8.9–12.9)
POTASSIUM SERPL-SCNC: 4.3 MMOL/L (ref 3.5–5.1)
RBC # BLD AUTO: 2.91 M/UL (ref 3.8–5.2)
SERVICE CMNT-IMP: NORMAL
SODIUM SERPL-SCNC: 140 MMOL/L (ref 136–145)
WBC # BLD AUTO: 7.4 K/UL (ref 3.6–11)

## 2018-09-17 PROCEDURE — 74011000250 HC RX REV CODE- 250: Performed by: HOSPITALIST

## 2018-09-17 PROCEDURE — 94760 N-INVAS EAR/PLS OXIMETRY 1: CPT

## 2018-09-17 PROCEDURE — 80048 BASIC METABOLIC PNL TOTAL CA: CPT | Performed by: INTERNAL MEDICINE

## 2018-09-17 PROCEDURE — 85025 COMPLETE CBC W/AUTO DIFF WBC: CPT | Performed by: INTERNAL MEDICINE

## 2018-09-17 PROCEDURE — 36415 COLL VENOUS BLD VENIPUNCTURE: CPT | Performed by: INTERNAL MEDICINE

## 2018-09-17 PROCEDURE — 82962 GLUCOSE BLOOD TEST: CPT

## 2018-09-17 PROCEDURE — 74011250637 HC RX REV CODE- 250/637: Performed by: HOSPITALIST

## 2018-09-17 RX ORDER — PANTOPRAZOLE SODIUM 40 MG/1
40 TABLET, DELAYED RELEASE ORAL 2 TIMES DAILY
Qty: 60 TAB | Refills: 0 | Status: SHIPPED | OUTPATIENT
Start: 2018-09-17

## 2018-09-17 RX ADMIN — PANTOPRAZOLE SODIUM 40 MG: 40 TABLET, DELAYED RELEASE ORAL at 08:44

## 2018-09-17 RX ADMIN — Medication 10 ML: at 05:17

## 2018-09-17 RX ADMIN — POLYETHYLENE GLYCOL 3350 17 G: 17 POWDER, FOR SOLUTION ORAL at 08:44

## 2018-09-17 NOTE — DISCHARGE SUMMARY
Hospitalist Discharge Summary     Patient ID:  Amilcar Taylor  [id number]  50 y.o.  12/12/1924    PCP on record: PROVIDER UNKNOWN    Admit date: 9/11/2018  Discharge date and time: 9/17/2018      DISCHARGE DIAGNOSIS:    Gastritis/duodenitis  Anemia  Dehydration      CONSULTATIONS:  IP CONSULT TO HOSPITALIST  IP CONSULT TO GASTROENTEROLOGY    Excerpted HPI from H&P of Janes Moore MD:  Amilcar Taylor is a 80 y.o.  female with dementia, HTN, DM, anxiety who is sent from private assisted living for agitation and elevated BP, possible stroke.     Patient usually lives at home in [place] with  and has caregiver part time. Because daughter and caregiver going on vacation, she was put into private assisted living facility home with her  for 3 months during summer, with plan to return home later this month.      At baseline, awake and conversant appropriately but does not remember; usually playful, stubborn, feed self, ambulate independently. Daughter visited over weekend and noted patient seemed somewhat lethargic and cold. Has not been eating much for several days because dentures were misplaced. Dentures found yesterday. + weight loss this year. Had complained of abdominal pain last week.     Facility sent patient because she was agitated and /146 today. Has not eat or walk since yesterday       ______________________________________________________________________  DISCHARGE SUMMARY/HOSPITAL COURSE:  for full details see H&P, daily progress notes, labs, consult notes. Acute blood loss anemia in the setting of heme positive stool. Hgb on admission of 5.8. Possibly due to upper GI bleed considering elevated BUN of 64  - s/p 2 units of prbc transfusion.    - hgb currently stable  - S/p EGD 9/14 which showed gastric erosions, gastritis and duodenitis, esophagitis and hiatal hernia.   - continue PPI.       Hypernatremia Na 150, resolved thought to be due to dehydration     LAWANDA Cr 3.04, possibly due to dehydration along with ARB use, improved with IVF  -No stone or hydronephrosis on CT abdomen  -Creatinine closer to basline      Possible uti with hypothermia, pyuria  --no bacteria on urine.    -No growth on urine culture. S/p one dose of Rocephin.  -no fever      Acute metabolic encephalopathy in the setting of dementia and hypernatremia and possible UTI  -Head CT scan no acute process  -per weekday hospitalist, mental status improved. -afebrile  -at her baseline      Scarring in right suprahilar on CTA chest.  Abnormal CXR with right suprahilar airspace disease  --given rocephin and azithromycin in ER for possible PNA.  However, CTA chest without lung mass or airspace disease.  Abnormality on CXR due to atelectasis vs. Scar tissue.  Not hypoxi or toxic looking. Antibiotics were discontinued.      Hypertension history  -Norvasc      DM type 2 history no requiring meds prior to admission  -on ssi and monitor     Hypothermia, resolved     Other CT findings: will need outpatient followup.      Code: d/w with mPOA, DNR/DNI as per weekday hospitalist  DVT prophylaxis: SCD  Surrogate decision maker:  POA daughter Edna Barron lives in [facility] [phone]. [facility] stepfather) is not POA     Dispo:  discharge to home today with daughter                 _______________________________________________________________________  Patient seen and examined by me on discharge day. Pertinent Findings:  Gen:    Not in distress  Chest: Clear lungs    _______________________________________________________________________  DISCHARGE MEDICATIONS:   Current Discharge Medication List      START taking these medications    Details   pantoprazole (PROTONIX) 40 mg tablet Take 1 Tab by mouth two (2) times a day. Qty: 60 Tab, Refills: 0         CONTINUE these medications which have NOT CHANGED    Details   amLODIPine (NORVASC) 10 mg tablet Take 10 mg by mouth daily. cholecalciferol (VITAMIN D3) 1,000 unit tablet Take 1,000 Units by mouth daily. cetirizine (ZYRTEC) 10 mg tablet Take 10 mg by mouth daily. aspirin delayed-release 81 mg tablet Take 81 mg by mouth daily. QUEtiapine (SEROQUEL) 25 mg tablet Take 25 mg by mouth two (2) times a day. diphenhydrAMINE (BENADRYL ALLERGY) 25 mg tablet Take 25 mg by mouth nightly as needed (seasonal allergies). STOP taking these medications       losartan (COZAAR) 50 mg tablet Comments:   Reason for Stopping:               My Recommended Diet, Activity, Wound Care, and follow-up labs are listed in the patient's Discharge Insturctions which I have personally completed and reviewed.     ______________________________________________________________________    Risk of deterioration: Low    Condition at Discharge:  Stable  ______________________________________________________________________    Disposition  Home with family, no needs  ______________________________________________________________________    Care Plan discussed with:   Patient, Family, RN, Care Manager, Consultant    ______________________________________________________________________    Code Status: DNR/DNI  ______________________________________________________________________      Follow up with:   PCP : PROVIDER UNKNOWN  Follow-up Information     Follow up With Details Comments Contact Info    Provider Unknown   Patient not available to ask                Total time in minutes spent coordinating this discharge (includes going over instructions, follow-up, prescriptions, and preparing report for sign off to her PCP) :  35 minutes    Signed:  Tatianna Pino MD

## 2018-09-17 NOTE — PROGRESS NOTES
If all in agreement, plan on pt's return to [facility] includes the [facility] assisted living today. Please contact dtr to transport, call report to [facility] at [phone], send emar, Rx, completed DDNR, and d/c instructions. Thanks [phone]  D/C plans discussed with dtr, MD, and staff. Dtr will transport pt to the [facility] address where she has 24/7 care lined up. The assisted living was notified by the dtr that she will not return as this was only a respite. MD spoke with dtr as well. Please send emar, d/c instructions, and completed DDNR. Thanks

## 2018-09-17 NOTE — PROGRESS NOTES
Bedside and Verbal shift change report given to Nadege HWANG (oncoming nurse) by Rudy Hernandez (offgoing nurse). Report included the following information SBAR, Kardex, Intake/Output, MAR and Recent Results.

## 2018-09-17 NOTE — DISCHARGE INSTRUCTIONS
HOSPITALIST DISCHARGE INSTRUCTIONS    NAME: Saida Dodson   :  1924   MRN:  [id number]     Date/Time:  2018 10:11 AM    ADMIT DATE: 2018   DISCHARGE DATE: 2018     Attending Physician: Jerome Krishnan MD    DISCHARGE DIAGNOSIS:  Anemia  Gastritis      Medications: Per above medication reconciliation. Pain Management: per above medications    Recommended diet: Dysphagia diet    Recommended activity: Activity as tolerated    Wound care: None    Indwelling devices:  None    Supplemental Oxygen: None    Required Lab work: CBC in 1 week, BMP in 1 week. Glucose management:  None    Code status: DNR        Outside physician follow up: Follow-up Information     Follow up With Details Comments Contact Info    Provider Unknown   Patient not available to ask          Follow with GI doctor in 2-4 weeks. Will be discharged home under the care of her daughter        Information obtained by :  I understand that if any problems occur once I am at home I am to contact my physician. I understand and acknowledge receipt of the instructions indicated above.                                                                                                                                            Physician's or R.N.'s Signature                                                                  Date/Time                                                                                                                                              Patient or Repres

## 2023-12-03 NOTE — ANESTHESIA PREPROCEDURE EVALUATION
Anesthetic History No history of anesthetic complications Review of Systems / Medical History Patient summary reviewed, nursing notes reviewed and pertinent labs reviewed Pulmonary Within defined limits Neuro/Psych Psychiatric history and dementia Comments: Anxiety Cardiovascular Hypertension Exercise tolerance: <4 METS 
  
GI/Hepatic/Renal 
  
 
 
Renal disease: CRI Comments: gi bleed Endo/Other Diabetes Anemia Other Findings Physical Exam 
 
Airway Mallampati: III Neck ROM: normal range of motion Mouth opening: Normal 
 
 Cardiovascular Regular rate and rhythm,  S1 and S2 normal,  no murmur, click, rub, or gallop Dental 
 
 
Comments: No loose teeth Pulmonary Breath sounds clear to auscultation Abdominal 
GI exam deferred Other Findings Anesthetic Plan ASA: 3 Anesthesia type: total IV anesthesia Induction: Intravenous Anesthetic plan and risks discussed with: Patient
English

## 2024-08-22 NOTE — PROGRESS NOTES
Physical Therapy Patient currently off floor for EGD. Will follow back later. Thank you, Laurence Johnson, PT 
 
 Tolerating donepezil 5 mg nightly, increase to 10 mg. If experiencing any insomnia or vivid dreams, take in am instead. If diarrhea or GI issues, can go back down to 5 mg. Refills sent.   Stable progression.   Discussed lifestyle modifications to help with memory loss.

## (undated) DEVICE — Device

## (undated) DEVICE — SOLIDIFIER MEDC 1200ML -- CONVERT TO 356117

## (undated) DEVICE — KENDALL RADIOLUCENT FOAM MONITORING ELECTRODE RECTANGULAR SHAPE: Brand: KENDALL

## (undated) DEVICE — CONTAINER SPEC 20 ML LID NEUT BUFF FORMALIN 10 % POLYPR STS

## (undated) DEVICE — SET ADMIN 16ML TBNG L100IN 2 Y INJ SITE IV PIGGY BK DISP

## (undated) DEVICE — SYR 10ML LUER LOK 1/5ML GRAD --

## (undated) DEVICE — BLOCK BITE ENDOSCP AD 21 MM W/ DIL BLU LF DISP

## (undated) DEVICE — BASIN EMSIS 16OZ GRAPHITE PLAS KID SHP MOLD GRAD FOR ORAL

## (undated) DEVICE — NEONATAL-ADULT SPO2 SENSOR: Brand: NELLCOR

## (undated) DEVICE — SYR 3ML LL TIP 1/10ML GRAD --

## (undated) DEVICE — CATH IV AUTOGRD BC PNK 20GA 25 -- INSYTE

## (undated) DEVICE — 1200 GUARD II KIT W/5MM TUBE W/O VAC TUBE: Brand: GUARDIAN

## (undated) DEVICE — Z DISCONTINUED PER MEDLINE LINE GAS SAMPLING O2/CO2 LNG AD 13 FT NSL W/ TBNG FILTERLINE

## (undated) DEVICE — NEEDLE HYPO 18GA L1.5IN PNK S STL HUB POLYPR SHLD REG BVL

## (undated) DEVICE — BAG SPEC BIOHZRD 10 X 10 IN --

## (undated) DEVICE — MEDI-VAC YANK SUCT HNDL W/TPRD BULBOUS TIP: Brand: CARDINAL HEALTH

## (undated) DEVICE — FORCEPS BX L160CM DIA8MM GRSP DISECT CUP TIP NONLOCKING ROT

## (undated) DEVICE — TOWEL 4 PLY TISS 19X30 SUE WHT